# Patient Record
Sex: FEMALE | Race: WHITE | NOT HISPANIC OR LATINO | Employment: FULL TIME | URBAN - METROPOLITAN AREA
[De-identification: names, ages, dates, MRNs, and addresses within clinical notes are randomized per-mention and may not be internally consistent; named-entity substitution may affect disease eponyms.]

---

## 2018-05-23 RX ORDER — LEVOTHYROXINE SODIUM 88 UG/1
TABLET ORAL
Qty: 90 TABLET | Refills: 0 | Status: SHIPPED | OUTPATIENT
Start: 2018-05-23 | End: 2018-05-23 | Stop reason: SDUPTHER

## 2018-05-24 RX ORDER — LEVOTHYROXINE SODIUM 88 UG/1
TABLET ORAL
Qty: 90 TABLET | Refills: 0 | Status: SHIPPED | OUTPATIENT
Start: 2018-05-24 | End: 2018-06-26

## 2018-06-08 ENCOUNTER — OFFICE VISIT (OUTPATIENT)
Dept: PRIMARY CARE | Facility: CLINIC | Age: 63
End: 2018-06-08
Payer: COMMERCIAL

## 2018-06-08 VITALS
OXYGEN SATURATION: 98 % | DIASTOLIC BLOOD PRESSURE: 74 MMHG | HEART RATE: 62 BPM | RESPIRATION RATE: 16 BRPM | HEIGHT: 66 IN | TEMPERATURE: 98.3 F | SYSTOLIC BLOOD PRESSURE: 106 MMHG | BODY MASS INDEX: 18.99 KG/M2 | WEIGHT: 118.2 LBS

## 2018-06-08 DIAGNOSIS — E55.9 VITAMIN D DEFICIENCY: ICD-10-CM

## 2018-06-08 DIAGNOSIS — E03.9 ACQUIRED HYPOTHYROIDISM: Primary | ICD-10-CM

## 2018-06-08 DIAGNOSIS — E78.9 LIPID DISORDER: ICD-10-CM

## 2018-06-08 DIAGNOSIS — I25.10 CORONARY ARTERY DISEASE INVOLVING NATIVE CORONARY ARTERY OF NATIVE HEART WITHOUT ANGINA PECTORIS: ICD-10-CM

## 2018-06-08 DIAGNOSIS — I10 ESSENTIAL HYPERTENSION: ICD-10-CM

## 2018-06-08 DIAGNOSIS — D50.9 IRON DEFICIENCY ANEMIA, UNSPECIFIED IRON DEFICIENCY ANEMIA TYPE: ICD-10-CM

## 2018-06-08 PROCEDURE — 90471 IMMUNIZATION ADMIN: CPT | Performed by: FAMILY MEDICINE

## 2018-06-08 PROCEDURE — 99214 OFFICE O/P EST MOD 30 MIN: CPT | Mod: 25 | Performed by: FAMILY MEDICINE

## 2018-06-08 PROCEDURE — 90750 HZV VACC RECOMBINANT IM: CPT | Performed by: FAMILY MEDICINE

## 2018-06-08 RX ORDER — VIT C/E/ZN/COPPR/LUTEIN/ZEAXAN 250MG-90MG
2 CAPSULE ORAL DAILY
COMMUNITY
Start: 2017-11-09

## 2018-06-08 RX ORDER — METOPROLOL TARTRATE 25 MG/1
0.5 TABLET, FILM COATED ORAL 2 TIMES DAILY
COMMUNITY
End: 2020-04-23

## 2018-06-08 RX ORDER — ASPIRIN 81 MG/1
81 TABLET ORAL DAILY
COMMUNITY
Start: 2015-08-28

## 2018-06-08 RX ORDER — ATORVASTATIN CALCIUM 20 MG/1
20 TABLET, FILM COATED ORAL DAILY
COMMUNITY
End: 2020-05-18 | Stop reason: SDUPTHER

## 2018-06-08 ASSESSMENT — ENCOUNTER SYMPTOMS
DYSURIA: 0
UNEXPECTED WEIGHT CHANGE: 0
SHORTNESS OF BREATH: 0
DYSPHORIC MOOD: 0
ABDOMINAL PAIN: 0
VOMITING: 0
ROS GI COMMENTS: NO CHANGE IN BMS  NO GER
COUGH: 0
NAUSEA: 0
FEVER: 0
FREQUENCY: 0

## 2018-06-08 NOTE — PROGRESS NOTES
Lidia Peña DO    [facility] - Family Medicine  [address]  [place] [address]  Phone: [phone]  Fax: [phone]     History of Present Illness   Subjective     Patient ID: Dominga Dee is a 63 y.o. female.    Dominga is here for FU on HTN, Chol , CAD -   BP w med is controlled  BMI=19   wt down 4 lbs that she says is due to increased walking  Cardio sees her for CAD / MI 8/15 and last FU 5/17 and stress tst neg 9/17 and told no FU needed for now  Chol on med -BW 3/17 and not done so reordered  Thyroid- on med -BW ok 6/17 and ordered  diet - good and same  exercise- more walks 1-2 hrs qd  Hx of low  Vit D-  on 2000 IU and BW ok 3/17  Immunoglobin G elevated and so was referred  to heme/onc and eval neg per 7/17 note and told to FU this summer    HM-  -flu shot 11/17   -[id number] due at 64  -pneumovax due at 65  -TdaP 1/12  -Zostavax 12/14   -SHingrix 6/18 today  -PAP/gyn exam ~2015 and  due so set up - given # for new gyn last appt and again today  -mammo 10/17 and due in 6 mos but not done so set up-has slip to set up and reminder from radiology  -DEXA due @ 64  -scope  overdue-has GI # to set up but wants to do  FIT instead but not done so plans to do and has cards at home  -BW 3/17 and FU 6/17 - ordered again now  -Hep C done by reuben and told neg so will get a copy  -EKG 9/13  -physical 11/17       Past Medical/Surgical/Family/Social History     The following have been reviewed and updated as appropriate in this visit:  Allergies  Meds  Problems         Past Medical History:   Diagnosis Date   • Allergic rhinitis    • Coronary artery disease    • Hyperproteinemia     w neg heme eval   • Hypertension    • Hypothyroidism    • Lipid disorder        Past Surgical History:   Procedure Laterality Date   • CARDIAC CATHETERIZATION      w 90% occlusion 8/15   • CHOLECYSTECTOMY     • KNEE SURGERY      ORIF patella   • TONSILLECTOMY         Family History   Problem Relation  Age of Onset   • Osteoporosis Mother    • Heart disease Father    • Hypertension Father    • Heart disease Brother        Social History     Social History   • Marital status:      Spouse name: N/A   • Number of children: 2   • Years of education: N/A     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      Social History Main Topics   • Smoking status: Never Smoker   • Smokeless tobacco: Never Used   • Alcohol use No   • Drug use: Unknown   • Sexual activity: Not on file     Other Topics Concern   • Not on file     Social History Narrative   • No narrative on file      Allergies and Medications     Allergies   Allergen Reactions   • No Known Allergies          Current Outpatient Prescriptions:   •  aspirin 81 mg enteric coated tablet, Take 81 mg by mouth daily., Disp: , Rfl:   •  atorvastatin (LIPITOR) 20 mg tablet, Take 20 mg by mouth daily., Disp: , Rfl:   •  cholecalciferol, vitamin D3, 1,000 unit capsule, Take 2 capsules by mouth daily., Disp: , Rfl:   •  levothyroxine (SYNTHROID) 88 mcg tablet, TAKE 1 TABLET BY MOUTH EVERY DAY, Disp: 90 tablet, Rfl: 0  •  metoprolol tartrate (LOPRESSOR) 25 mg tablet, Take 0.5 tablets by mouth 2 (two) times a day., Disp: , Rfl:   •  multivitamin with minerals liquid, 2 tablespoons daily, Disp: , Rfl:    Review of Systems       Review of Systems   Constitutional: Negative for fever and unexpected weight change.   HENT:        No URI   Respiratory: Negative for cough and shortness of breath.    Cardiovascular: Negative for chest pain and leg swelling.   Gastrointestinal: Negative for abdominal pain, nausea and vomiting.        No change in Bms  No ANTONY   Genitourinary: Negative for dysuria, frequency and urgency.   Skin: Negative for rash.   Psychiatric/Behavioral: Negative for behavioral problems and dysphoric mood.      Physical Examination       Objective     Vitals:    06/08/18 1105   BP: 106/74   Pulse: 62   Resp: 16   Temp: 36.8 °C (98.3 °F)   SpO2: 98%       Wt  "Readings from Last 3 Encounters:   06/08/18 53.6 kg (118 lb 3.2 oz)       Ht Readings from Last 3 Encounters:   06/08/18 1.671 m (5' 5.8\")       BP Readings from Last 3 Encounters:   06/08/18 106/74       Physical Exam   Constitutional: She is oriented to person, place, and time. She appears well-developed and well-nourished. No distress.   HENT:   Head: Normocephalic and atraumatic.   Neck: Neck supple. No thyromegaly present.   Cardiovascular: Normal rate and regular rhythm.    No pedal edema   Pulmonary/Chest: Effort normal and breath sounds normal.   Abdominal: Soft. Bowel sounds are normal. There is no tenderness.   Lymphadenopathy:     She has no cervical adenopathy.   Neurological: She is alert and oriented to person, place, and time.   Skin: Skin is warm and dry.   Psychiatric: She has a normal mood and affect. Her behavior is normal.   Nursing note and vitals reviewed.     Laboratory Results     Lab Results   Component Value Date    WBC 4.90 08/13/2017    HGB 10.9 (L) 08/13/2017    HCT 32.8 (L) 08/13/2017    MCV 90.4 08/13/2017     08/13/2017          Chemistry        Component Value Date/Time     04/12/2017 1536    K 4.2 04/12/2017 1536     04/12/2017 1536    CO2 25 04/12/2017 1536    BUN 9 04/12/2017 1536    CREATININE 0.5 (L) 04/12/2017 1536        Component Value Date/Time    CALCIUM 9.2 04/12/2017 1536    ALKPHOS 68 04/12/2017 1536    AST 30 04/12/2017 1536    ALT 25 04/12/2017 1536    BILITOT 0.5 04/12/2017 1536            Lab Results   Component Value Date    GLUCOSE 84 04/12/2017    CALCIUM 9.2 04/12/2017     04/12/2017    K 4.2 04/12/2017    CO2 25 04/12/2017     04/12/2017    BUN 9 04/12/2017    CREATININE 0.5 (L) 04/12/2017       Lab Results   Component Value Date    CHOL 118 (L) 03/11/2017    CHOL 164 02/06/2016    CHOL 171 08/26/2015     Lab Results   Component Value Date    HDL 65 03/11/2017    HDL 69 02/06/2016    HDL 53 (L) 08/26/2015     Lab Results "   Component Value Date    LDLCALC 42 03/11/2017    LDLCALC 83 02/06/2016    LDLCALC 103 (H) 08/26/2015     Lab Results   Component Value Date    TRIG 56 03/11/2017    TRIG 59 02/06/2016    TRIG 74 08/26/2015     No results found for: CHOLHDL    Lab Results   Component Value Date    TSH 0.53 06/09/2017       No results found for: HGBA1C    Lab Results   Component Value Date    HEPCAB NEGATIVE 05/08/2017         Immunization History   Administered Date(s) Administered   • Influenza Split High Dose Preservative Free IM 09/30/2014   • Influenza Split Preservative Free ID 09/10/2013   • Influenza Vaccine Quadrivalent 3 Yr And Older 08/01/2016   • Influenza Vaccine Quadrivalent Preservative Free 6-35 Months 09/10/2015   • Influenza, Quadrivalent 11/09/2017   • Influenza, Unspecified 09/10/2015   • Tdap 01/16/2012   • Zoster 12/22/2014          Assessment and Plan       Assessment/Plan     Problem List     CAD (coronary artery disease)    Overview     Overview: stress echo neg 9/17         Current Assessment & Plan     Stable w no symptoms  Txn: B blocker, statin and ASA         Relevant Orders    Lipid panel    Hypothyroidism - Primary    Overview     Overview:          Current Assessment & Plan     BW ordered on med         Relevant Orders    TSH w reflex FT4    Vitamin D deficiency    Overview     Overview:          Current Assessment & Plan     Is on supplement         Hypertension    Current Assessment & Plan     Stable w txn         Relevant Orders    Comprehensive metabolic panel    Lipid disorder    Current Assessment & Plan     BW ordered on med         Relevant Orders    Lipid panel    Comprehensive metabolic panel          Return physical 11/18.    Orders Placed This Encounter   Procedures   • Shingrix   • Lipid panel     Standing Status:   Future     Number of Occurrences:   1     Standing Expiration Date:   6/8/2019   • TSH w reflex FT4     Standing Status:   Future     Number of Occurrences:   1     Standing  Expiration Date:   6/8/2019   • Comprehensive metabolic panel     Standing Status:   Future     Number of Occurrences:   1     Standing Expiration Date:   6/8/2019   • CBC     Standing Status:   Future     Number of Occurrences:   1     Standing Expiration Date:   6/8/2019        Inés Peña DO  6/8/2018

## 2018-06-16 LAB
ALBUMIN SERPL-MCNC: 4.3 G/DL (ref 3.6–4.8)
ALBUMIN/GLOB SERPL: 1.2 {RATIO} (ref 1.2–2.2)
ALP SERPL-CCNC: 72 IU/L (ref 39–117)
ALT SERPL-CCNC: 24 IU/L (ref 0–32)
AST SERPL-CCNC: 32 IU/L (ref 0–40)
BASOPHILS # BLD AUTO: 0 X10E3/UL (ref 0–0.2)
BASOPHILS NFR BLD AUTO: 0 %
BILIRUB SERPL-MCNC: 0.3 MG/DL (ref 0–1.2)
BUN SERPL-MCNC: 6 MG/DL (ref 8–27)
BUN/CREAT SERPL: 11 (ref 12–28)
CALCIUM SERPL-MCNC: 9.3 MG/DL (ref 8.7–10.3)
CHLORIDE SERPL-SCNC: 102 MMOL/L (ref 96–106)
CHOLEST SERPL-MCNC: 124 MG/DL (ref 100–199)
CO2 SERPL-SCNC: 24 MMOL/L (ref 20–29)
CREAT SERPL-MCNC: 0.55 MG/DL (ref 0.57–1)
EOSINOPHIL # BLD AUTO: 0.1 X10E3/UL (ref 0–0.4)
EOSINOPHIL NFR BLD AUTO: 1 %
ERYTHROCYTE [DISTWIDTH] IN BLOOD BY AUTOMATED COUNT: 12.9 % (ref 12.3–15.4)
GFR SERPLBLD CREATININE-BSD FMLA CKD-EPI: 115 ML/MIN/1.73
GFR SERPLBLD CREATININE-BSD FMLA CKD-EPI: 99 ML/MIN/1.73
GLOBULIN SER CALC-MCNC: 3.7 G/DL (ref 1.5–4.5)
GLUCOSE SERPL-MCNC: 79 MG/DL (ref 65–99)
HCT VFR BLD AUTO: 36.2 % (ref 34–46.6)
HDLC SERPL-MCNC: 64 MG/DL
HGB BLD-MCNC: 11.7 G/DL (ref 11.1–15.9)
IMM GRANULOCYTES # BLD: 0 X10E3/UL (ref 0–0.1)
IMM GRANULOCYTES NFR BLD: 0 %
LABCORP AMBIG ABBREV: NORMAL
LABCORP AMBIG ABBREV: NORMAL
LDLC SERPL CALC-MCNC: 51 MG/DL (ref 0–99)
LYMPHOCYTES # BLD AUTO: 1.5 X10E3/UL (ref 0.7–3.1)
LYMPHOCYTES NFR BLD AUTO: 42 %
MCH RBC QN AUTO: 30.2 PG (ref 26.6–33)
MCHC RBC AUTO-ENTMCNC: 32.3 G/DL (ref 31.5–35.7)
MCV RBC AUTO: 94 FL (ref 79–97)
MONOCYTES # BLD AUTO: 0.2 X10E3/UL (ref 0.1–0.9)
MONOCYTES NFR BLD AUTO: 6 %
NEUTROPHILS # BLD AUTO: 1.8 X10E3/UL (ref 1.4–7)
NEUTROPHILS NFR BLD AUTO: 51 %
PLATELET # BLD AUTO: 222 X10E3/UL (ref 150–379)
POTASSIUM SERPL-SCNC: 4.7 MMOL/L (ref 3.5–5.2)
PROT SERPL-MCNC: 8 G/DL (ref 6–8.5)
RBC # BLD AUTO: 3.87 X10E6/UL (ref 3.77–5.28)
SODIUM SERPL-SCNC: 143 MMOL/L (ref 134–144)
TRIGL SERPL-MCNC: 46 MG/DL (ref 0–149)
TSH SERPL DL<=0.005 MIU/L-ACNC: 0.21 UIU/ML (ref 0.45–4.5)
VLDLC SERPL CALC-MCNC: 9 MG/DL (ref 5–40)
WBC # BLD AUTO: 3.5 X10E3/UL (ref 3.4–10.8)

## 2018-06-26 DIAGNOSIS — E03.9 ACQUIRED HYPOTHYROIDISM: Primary | ICD-10-CM

## 2018-06-26 RX ORDER — LEVOTHYROXINE SODIUM 75 UG/1
75 TABLET ORAL
Qty: 90 TABLET | Refills: 0 | Status: SHIPPED | OUTPATIENT
Start: 2018-06-26 | End: 2018-08-08 | Stop reason: SDUPTHER

## 2018-08-08 RX ORDER — LEVOTHYROXINE SODIUM 75 UG/1
75 TABLET ORAL
Qty: 90 TABLET | Refills: 0 | Status: SHIPPED | OUTPATIENT
Start: 2018-08-08 | End: 2018-08-16 | Stop reason: SDUPTHER

## 2018-08-08 RX ORDER — LEVOTHYROXINE SODIUM 88 UG/1
TABLET ORAL
Qty: 90 TABLET | Refills: 0 | OUTPATIENT
Start: 2018-08-08

## 2018-08-16 RX ORDER — LEVOTHYROXINE SODIUM 75 UG/1
75 TABLET ORAL
Qty: 90 TABLET | Refills: 0 | Status: SHIPPED | OUTPATIENT
Start: 2018-08-16 | End: 2018-10-26 | Stop reason: SDUPTHER

## 2018-08-16 RX ORDER — LEVOTHYROXINE SODIUM 88 UG/1
TABLET ORAL
Qty: 90 TABLET | Refills: 1 | OUTPATIENT
Start: 2018-08-16

## 2018-08-16 NOTE — TELEPHONE ENCOUNTER
Dr. Peña    Last office visit -06/08/2018  *expected visit -11/16/2018      88mcg tablet daily  #90 1 refills     [facility] delivery

## 2018-09-17 ENCOUNTER — TELEPHONE (OUTPATIENT)
Dept: PRIMARY CARE | Facility: CLINIC | Age: 63
End: 2018-09-17

## 2018-09-17 NOTE — TELEPHONE ENCOUNTER
Pt LVM stating she is moving to [place] next month and needs to set up her 2nd Shingrix before she leaves. Pt mentioned preferring a Friday apt. Pt can come in any time on Friday. LMOM for pt to return call.

## 2018-10-26 RX ORDER — LEVOTHYROXINE SODIUM 88 UG/1
TABLET ORAL
Qty: 90 TABLET | Refills: 0 | OUTPATIENT
Start: 2018-10-26

## 2018-10-26 RX ORDER — LEVOTHYROXINE SODIUM 75 UG/1
75 TABLET ORAL
Qty: 90 TABLET | Refills: 0 | Status: SHIPPED | OUTPATIENT
Start: 2018-10-26 | End: 2020-05-18 | Stop reason: SDUPTHER

## 2018-11-13 ENCOUNTER — TELEPHONE (OUTPATIENT)
Dept: PRIMARY CARE | Facility: CLINIC | Age: 63
End: 2018-11-13

## 2018-11-13 NOTE — TELEPHONE ENCOUNTER
LMOM requesting return call for pt to schedule 2nd Shringrix. Pt stated last call that she was moving to [place].

## 2020-04-06 ENCOUNTER — TELEPHONE (OUTPATIENT)
Dept: PRIMARY CARE | Facility: CLINIC | Age: 65
End: 2020-04-06

## 2020-04-06 NOTE — TELEPHONE ENCOUNTER
Ms. Dee is a prior patient of Dr Peña and would like to continue care with her since she is moving back to the area.  She will need prescription renewals in ~2 months and would like an office visit then.  Please contact Dominga to confirm whether or not Dr. Peña can see her as a new patient and, if so, when?  Thank you

## 2020-04-23 ENCOUNTER — TELEMEDICINE (OUTPATIENT)
Dept: PRIMARY CARE | Facility: CLINIC | Age: 65
End: 2020-04-23
Payer: COMMERCIAL

## 2020-04-23 DIAGNOSIS — I10 ESSENTIAL HYPERTENSION: ICD-10-CM

## 2020-04-23 DIAGNOSIS — E03.9 ACQUIRED HYPOTHYROIDISM: Primary | ICD-10-CM

## 2020-04-23 DIAGNOSIS — Z13.9 ENCOUNTER FOR SCREENING: ICD-10-CM

## 2020-04-23 DIAGNOSIS — I25.10 CORONARY ARTERY DISEASE INVOLVING NATIVE CORONARY ARTERY OF NATIVE HEART WITHOUT ANGINA PECTORIS: ICD-10-CM

## 2020-04-23 DIAGNOSIS — E78.9 LIPID DISORDER: ICD-10-CM

## 2020-04-23 PROCEDURE — 99214 OFFICE O/P EST MOD 30 MIN: CPT | Mod: 95 | Performed by: FAMILY MEDICINE

## 2020-04-23 RX ORDER — ALENDRONATE SODIUM 70 MG/1
70 TABLET ORAL WEEKLY
COMMUNITY
End: 2020-05-18 | Stop reason: SDUPTHER

## 2020-04-23 ASSESSMENT — ENCOUNTER SYMPTOMS
SHORTNESS OF BREATH: 0
FEVER: 0
NAUSEA: 0
WEAKNESS: 0
SORE THROAT: 0
WHEEZING: 0
DYSPHORIC MOOD: 0
VOMITING: 0
DYSURIA: 0
COUGH: 0
FREQUENCY: 0
ABDOMINAL PAIN: 0
ROS GI COMMENTS: NO CHANGE IN BMS  NO GER
UNEXPECTED WEIGHT CHANGE: 0

## 2020-04-23 NOTE — PROGRESS NOTES
Inés Peña DO  [facility]  Family Medicine  [address], [address]  [place] [address]  Phone: [phone]  Fax: [phone]     [facility] TELEMEDICINE ENCOUNTER  History of Present Illness/Chief Complaint     Dominga Dee is a 65 y.o. established patient at [facility]. This patient is participating in a telemedicine encounter due to inability or contraindication(s) to present to the office in-person.     This telemedicine visit will be billed to the patient's health insurance or to the patient directly (if this individual is self-insured). The patient verbalized an understanding that he/she will be responsible for any co-payments, deductibles, or co-insurances that apply to this telemedicine visit. This visit was not related to any in-person evaluation or appointment within the last seven days. In addition, the patient does not have an upcoming appointment with any of our providers within the next 24 hours.The patient has verbalized an understanding of the above, and has verbally consented to this virtual appointment. Time spent during this virtual encounter totaled: _27__ minutes.   Request for Consent:   Video Encounter   Hello, my name is Inés Peña DO.  Before we proceed, can you please verify your identification by telling me your full name and date of birth?  Can you tell me who is in the room with you?    You and I are about to have a telemedicine check-in or visit because you have requested it.  This is a live video-conference.  I am a real person, speaking to you in real time.  There is no one else with me on the video-conference.  However, when we use ([employer], Skype, etc) it is important for you to know that the video-conference may not be secure or private.  I am not recording this conversation and I am asking you not to record it.  This telemedicine visit will be billed to your health insurance or you, if you are self-insured.  You understand you  will be responsible for any copayments or coinsurances that apply to your telemedicine visit.  Before starting our telemedicine visit, I am required to get your consent for this virtual check-in or visit by telemedicine. Do you consent?    Patient Response to Request for Consent:  Yes     Dominga agrees to telemed visit with me today to review her Med Hx  Last seen 6/18 - moved to [place] and then moved back here 3 wks ago since felt too far from family in [place] so renting here  Hypothyroid w TSH high in 2018 so lowered to 75 mcg Levothyroxine qd and BW done 1/2020 and thinks  that it was normal and no med change  Chol on Lipitor 20 qd  - BW to goal 2018 and 1/2020 and thinks was normal w no med change  HTN  - BP on Metoprolol 25 1/2 BID last appt but now off it after seeing cardio in [place] and not chked recently but has a home cuff so chk readings and send in 3 wks  Cardio sees her for CAD / MI 8/15 and last FU 5/17 and stress tst neg 9/17 - last seen by [place] cardio 2/2020 w no concerns  Diet  -good but overeats  Exercise -walks one hour per day  BMI 19 at last appt  Hx of low  Vit D-  on 2000 IU and BW ok 3/17  Immunoglobin G elevated and so was referred  to heme/onc and eval neg per 7/17 note and says had FU 2018 and no FU needed  Osteoporosis on DEXA this winter so Fosamax started ~12/19    HM-  -flu shot ~10/19  -Tdap 1/12  -[id number] ~2018  -[id number] may be due so will records  -Shingrix 6/18 and got second and actually did first over also so will get dates  -Zostavax 2014  -PAP/gyn 5/15 w neg PAP/HPV and done 4/19 that was normal and told no further PAPs needed  -mammo 10/17 and last done there ~2/2020 and due 6 mos on L as FU  -scope not done so FIT ordered to send to her  -DEXA 12/19 +  -BW 6/18 and done 2/2020  -Hep C screen neg w heme in past  -EKG 9/13  -MWV 1/2020 w last dr  -falls screen neg 1/2020     w 2 kids who are in this area and siblings and grandkids here so was flying up often when in [place]  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   HENT: Negative for ear pain and sore throat.         No URI   Respiratory: Negative for cough, shortness of breath and wheezing.    Cardiovascular: Negative for chest pain and leg swelling.   Gastrointestinal: Negative for abdominal pain, nausea and vomiting.        No change in Bms  No ANTONY   Endocrine: Negative for polyuria.   Genitourinary: Negative for dysuria, frequency and urgency.   Skin: Negative for rash.   Neurological: Negative for weakness.   Psychiatric/Behavioral: Negative for behavioral problems and dysphoric mood.       Past Medical Hx - Surgical Hx - Family Hx - Social Hx     The following have been reviewed and updated as appropriate in this visit:  Allergies  Meds  Problems         Past Medical History:   Diagnosis Date   • Allergic rhinitis    • Coronary artery disease    • Hyperproteinemia     w neg heme eval   • Hypertension    • Hypothyroidism    • Lipid disorder        Past Surgical History:   Procedure Laterality Date   • CARDIAC CATHETERIZATION      w 90% occlusion 8/15   • CHOLECYSTECTOMY     • KNEE SURGERY      ORIF patella   • TONSILLECTOMY         Family History   Problem Relation Age of Onset   • Osteoporosis Biological Mother    • Heart disease Biological Father    • Hypertension Biological Father    • Heart disease Biological Brother        Social History     Socioeconomic History   • Marital status:      Spouse name: Not on file   • Number of children: 2   • Years of education: Not on file   • Highest education level: Not on file   Occupational History   • 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   Social Needs   • Financial resource strain: Not on file   • Food insecurity:     Worry: Not on file     Inability: Not on file   • Transportation needs:     Medical: Not on file     Non-medical: Not on file   Tobacco Use   • Smoking status:  Never Smoker   • Smokeless tobacco: Never Used   Substance and Sexual Activity   • Alcohol use: No   • Drug use: Not on file   • Sexual activity: Not on file   Lifestyle   • Physical activity:     Days per week: Not on file     Minutes per session: Not on file   • Stress: Not on file   Relationships   • Social connections:     Talks on phone: Not on file     Gets together: Not on file     Attends Mosque service: Not on file     Active member of club or organization: Not on file     Attends meetings of clubs or organizations: Not on file     Relationship status: Not on file   • Intimate partner violence:     Fear of current or ex partner: Not on file     Emotionally abused: Not on file     Physically abused: Not on file     Forced sexual activity: Not on file   Other Topics Concern   • Not on file   Social History Narrative   • Not on file        Allergies & Medications     Allergies   Allergen Reactions   • No Known Allergies        Current Outpatient Medications   Medication Sig Dispense Refill   • alendronate (FOSAMAX) 70 mg tablet Take 70 mg by mouth once a week. Take on an empty stomach and do not lie down for the next 30 min.  Started 2019     • multivitamin tablet Take by mouth daily. w Vit C     • aspirin 81 mg enteric coated tablet Take 81 mg by mouth daily.     • atorvastatin (LIPITOR) 20 mg tablet Take 20 mg by mouth daily.     • cholecalciferol, vitamin D3, 1,000 unit capsule Take 2 capsules by mouth daily.     • levothyroxine (SYNTHROID) 75 mcg tablet Take 1 tablet (75 mcg total) by mouth daily. 90 tablet 0     No current facility-administered medications for this visit.         Laboratory Results     Lab Results   Component Value Date    WBC 3.5 06/15/2018    HGB 11.7 06/15/2018    HCT 36.2 06/15/2018     06/15/2018        Lab Results   Component Value Date    GLUCOSE 79 06/15/2018    CALCIUM 9.2 04/12/2017     06/15/2018    K 4.7 06/15/2018    CO2 24 06/15/2018     06/15/2018     BUN 6 (L) 06/15/2018    CREATININE 0.55 (L) 06/15/2018    ALKPHOS 72 06/15/2018    AST 32 06/15/2018    ALT 24 06/15/2018    BILITOT 0.3 06/15/2018    ALBUMIN 4.3 06/15/2018       Lab Results   Component Value Date    CHOL 124 06/15/2018     Lab Results   Component Value Date    HDL 64 06/15/2018     Lab Results   Component Value Date    LDLCALC 51 06/15/2018     Lab Results   Component Value Date    TRIG 46 06/15/2018       Lab Results   Component Value Date    TSH 0.207 (L) 06/15/2018       No results found for: HGBA1C    Lab Results   Component Value Date    HEPCAB NEGATIVE 05/08/2017       Immunization History   Administered Date(s) Administered   • Influenza Split High Dose Preservative Free IM 09/30/2014   • Influenza Split Preservative Free ID 09/10/2013   • Influenza Vaccine Quadrivalent 3 Yr And Older 08/01/2016   • Influenza Vaccine Quadrivalent Preservative Free 6-35 Months 09/10/2015   • Influenza, Quadrivalent 11/09/2017   • Influenza, Unspecified 09/10/2015   • Tdap 01/16/2012   • Zoster 12/22/2014   • Zoster Vaccine Recombinant Adjuvanted (Shingrix) 06/08/2018       Health Maintenance Topics with due status: Overdue       Topic Date Due    DEXA Scan 1954    Medicare Annual Wellness Visit 04/16/1972    Colonoscopy 04/16/2004    Zoster Vaccine 08/03/2018    Pneumococcal (65 years and older) 04/16/2019    Annual Falls Risk Screening 04/16/2019    Mammogram 10/13/2019     Health Maintenance Topics with due status: Not Due       Topic Last Completion Date    DTaP, Tdap, and Td Vaccines 01/16/2012    Influenza Vaccine 11/09/2017     Health Maintenance Topics with due status: Completed       Topic Last Completion Date    Hepatitis C Screening 11/14/2017     Health Maintenance Topics with due status: Aged Out       Topic Date Due    Meningococcal ACWY Aged Out    HIB Vaccines Aged Out    IPV Vaccines Aged Out    HPV Vaccines Aged Out    Pneumococcal Aged Out     Health Maintenance Topics with due  status: Discontinued       Topic Date Due    Varicella Vaccines Discontinued        Assessment and Plan     Assessment/Plan     Problem List Items Addressed This Visit        Circulatory    CAD (coronary artery disease)    Overview     Overview: stress echo neg 9/17         Current Assessment & Plan     See HPI for assessment and plan on all med Dxs         Hypertension       Endocrine/Metabolic    Hypothyroidism - Primary    Overview     Overview:          Lipid disorder       Other    Encounter for screening    Relevant Orders    Fecal Immunochemical          Follow-up: 4 mos for med SUSY Peña DO    4/23/2020

## 2020-05-04 ENCOUNTER — LAB REQUISITION (OUTPATIENT)
Dept: LAB | Facility: HOSPITAL | Age: 65
End: 2020-05-04
Attending: FAMILY MEDICINE
Payer: COMMERCIAL

## 2020-05-04 DIAGNOSIS — Z13.9 ENCOUNTER FOR SCREENING, UNSPECIFIED: ICD-10-CM

## 2020-05-06 LAB — HEMOCCULT STL QL IA: NEGATIVE

## 2020-05-18 RX ORDER — LEVOTHYROXINE SODIUM 75 UG/1
75 TABLET ORAL
Qty: 90 TABLET | Refills: 0 | Status: SHIPPED | OUTPATIENT
Start: 2020-05-18 | End: 2020-06-03 | Stop reason: SDUPTHER

## 2020-05-18 RX ORDER — ALENDRONATE SODIUM 70 MG/1
70 TABLET ORAL WEEKLY
Qty: 12 TABLET | Refills: 0 | Status: SHIPPED | OUTPATIENT
Start: 2020-05-18 | End: 2020-05-19 | Stop reason: SDUPTHER

## 2020-05-18 RX ORDER — ATORVASTATIN CALCIUM 20 MG/1
20 TABLET, FILM COATED ORAL DAILY
Qty: 90 TABLET | Refills: 0 | Status: SHIPPED | OUTPATIENT
Start: 2020-05-18 | End: 2020-06-03 | Stop reason: SDUPTHER

## 2020-05-18 NOTE — TELEPHONE ENCOUNTER
Medicine Refill Request    Last Office Visit: 6/8/2018  Last Telemedicine Visit: 4/23/2020 Inés Peña,     Next Office Visit: Visit date not found  Next Telemedicine Visit: Visit date not found         Current Outpatient Medications:   •  alendronate (FOSAMAX) 70 mg tablet, Take 70 mg by mouth once a week. Take on an empty stomach and do not lie down for the next 30 min. Started 2019, Disp: , Rfl:   •  aspirin 81 mg enteric coated tablet, Take 81 mg by mouth daily., Disp: , Rfl:   •  atorvastatin (LIPITOR) 20 mg tablet, Take 20 mg by mouth daily., Disp: , Rfl:   •  cholecalciferol, vitamin D3, 1,000 unit capsule, Take 2 capsules by mouth daily., Disp: , Rfl:   •  levothyroxine (SYNTHROID) 75 mcg tablet, Take 1 tablet (75 mcg total) by mouth daily., Disp: 90 tablet, Rfl: 0  •  multivitamin tablet, Take by mouth daily. w Vit C, Disp: , Rfl:       BP Readings from Last 3 Encounters:   06/08/18 106/74       Recent Lab results:  Lab Results   Component Value Date    CHOL 124 06/15/2018   ,   Lab Results   Component Value Date    HDL 64 06/15/2018   ,   Lab Results   Component Value Date    LDLCALC 51 06/15/2018   ,   Lab Results   Component Value Date    TRIG 46 06/15/2018        Lab Results   Component Value Date    GLUCOSE 79 06/15/2018   , No results found for: HGBA1C      Lab Results   Component Value Date    CREATININE 0.55 (L) 06/15/2018       Lab Results   Component Value Date    TSH 0.207 (L) 06/15/2018

## 2020-05-19 RX ORDER — ALENDRONATE SODIUM 70 MG/1
70 TABLET ORAL WEEKLY
Qty: 12 TABLET | Refills: 0 | Status: SHIPPED | OUTPATIENT
Start: 2020-05-19 | End: 2020-06-03 | Stop reason: SDUPTHER

## 2020-05-19 RX ORDER — ALENDRONATE SODIUM 70 MG/1
70 TABLET ORAL WEEKLY
Qty: 12 TABLET | Refills: 0 | Status: SHIPPED | OUTPATIENT
Start: 2020-05-19 | End: 2020-05-19 | Stop reason: SDUPTHER

## 2020-06-03 ENCOUNTER — TELEPHONE (OUTPATIENT)
Dept: PRIMARY CARE | Facility: CLINIC | Age: 65
End: 2020-06-03

## 2020-06-03 RX ORDER — LEVOTHYROXINE SODIUM 75 UG/1
75 TABLET ORAL
Qty: 90 TABLET | Refills: 1 | Status: SHIPPED | OUTPATIENT
Start: 2020-06-03 | End: 2020-11-22 | Stop reason: SDUPTHER

## 2020-06-03 RX ORDER — ATORVASTATIN CALCIUM 20 MG/1
20 TABLET, FILM COATED ORAL NIGHTLY
Qty: 90 TABLET | Refills: 1 | Status: SHIPPED | OUTPATIENT
Start: 2020-06-03 | End: 2021-01-08 | Stop reason: SDUPTHER

## 2020-06-03 RX ORDER — ALENDRONATE SODIUM 70 MG/1
70 TABLET ORAL WEEKLY
Qty: 12 TABLET | Refills: 0 | Status: SHIPPED | OUTPATIENT
Start: 2020-06-03 | End: 2020-08-26

## 2020-06-03 NOTE — TELEPHONE ENCOUNTER
Called patient to clarify the medications she's requesting. LVM to call back to office for the names and dosages of meds. Suspect she's referring to Fosamax 70 mg, Lipitor 20 mg, and Synthroid 75 mcg, as these were e-sent on 5/18/20 by PCP to mail-order RX.     [facility] reported that her 2 scripts (Lip and Syn) were frozen and never sent. No clear reasoning. Cancelled.     [facility] states they did not have deliver address for patient and they could not reach her when attempted. Med cancelled.     Re-sent medications to local RX as requested by patient and sent portal email stating she needs to call [facility] if she wants to use them in the future.

## 2020-06-03 NOTE — TELEPHONE ENCOUNTER
Pt states [facility] cancelled out her prescription order so she wants her prescriptions (3 total) called into her local pharmacy  Because she is having problems with the mail order pharmacy      [address] [facility] ph [phone]

## 2020-08-26 RX ORDER — ALENDRONATE SODIUM 70 MG/1
TABLET ORAL
Qty: 12 TABLET | Refills: 0 | Status: SHIPPED | OUTPATIENT
Start: 2020-08-26 | End: 2020-08-28 | Stop reason: SDUPTHER

## 2020-08-26 NOTE — TELEPHONE ENCOUNTER
Medicine Refill Request    Last Office Visit: 6/8/2018  Last Telemedicine Visit: 4/23/2020 Inés Peña DO    Next Office Visit: 10/1/2020  Next Telemedicine Visit: Visit date not found         Current Outpatient Medications:   •  alendronate (FOSAMAX) 70 mg tablet, Take 1 tablet (70 mg total) by mouth once a week. Take on an empty stomach and do not lie down for the next 30 min. Started 2019, Disp: 12 tablet, Rfl: 0  •  aspirin 81 mg enteric coated tablet, Take 81 mg by mouth daily., Disp: , Rfl:   •  atorvastatin (LIPITOR) 20 mg tablet, Take 1 tablet (20 mg total) by mouth nightly., Disp: 90 tablet, Rfl: 1  •  cholecalciferol, vitamin D3, 1,000 unit capsule, Take 2 capsules by mouth daily., Disp: , Rfl:   •  levothyroxine (SYNTHROID) 75 mcg tablet, Take 1 tablet (75 mcg total) by mouth daily. Take on an empty stomach., Disp: 90 tablet, Rfl: 1  •  multivitamin tablet, Take by mouth daily. w Vit C, Disp: , Rfl:       BP Readings from Last 3 Encounters:   06/08/18 106/74       Recent Lab results:  Lab Results   Component Value Date    CHOL 124 06/15/2018   ,   Lab Results   Component Value Date    HDL 64 06/15/2018   ,   Lab Results   Component Value Date    LDLCALC 51 06/15/2018   ,   Lab Results   Component Value Date    TRIG 46 06/15/2018        Lab Results   Component Value Date    GLUCOSE 79 06/15/2018   , No results found for: HGBA1C      Lab Results   Component Value Date    CREATININE 0.55 (L) 06/15/2018       Lab Results   Component Value Date    TSH 0.207 (L) 06/15/2018

## 2020-08-27 NOTE — TELEPHONE ENCOUNTER
Medicine Refill Request    Last Office Visit: 6/8/2018  Last Telemedicine Visit: 4/23/2020 Inés Peña DO    Next Office Visit: 10/1/2020  Next Telemedicine Visit: Visit date not found         Current Outpatient Medications:   •  alendronate (FOSAMAX) 70 mg tablet, TAKE 1 TABLET BY MOUTH EVERY WEEK. TAKE ON EMPTY STOMACH, DO NOT LIE DOWN FOR 30 MINS, Disp: 12 tablet, Rfl: 0  •  aspirin 81 mg enteric coated tablet, Take 81 mg by mouth daily., Disp: , Rfl:   •  atorvastatin (LIPITOR) 20 mg tablet, Take 1 tablet (20 mg total) by mouth nightly., Disp: 90 tablet, Rfl: 1  •  cholecalciferol, vitamin D3, 1,000 unit capsule, Take 2 capsules by mouth daily., Disp: , Rfl:   •  levothyroxine (SYNTHROID) 75 mcg tablet, Take 1 tablet (75 mcg total) by mouth daily. Take on an empty stomach., Disp: 90 tablet, Rfl: 1  •  multivitamin tablet, Take by mouth daily. w Vit C, Disp: , Rfl:       BP Readings from Last 3 Encounters:   06/08/18 106/74       Recent Lab results:  Lab Results   Component Value Date    CHOL 124 06/15/2018   ,   Lab Results   Component Value Date    HDL 64 06/15/2018   ,   Lab Results   Component Value Date    LDLCALC 51 06/15/2018   ,   Lab Results   Component Value Date    TRIG 46 06/15/2018        Lab Results   Component Value Date    GLUCOSE 79 06/15/2018   , No results found for: HGBA1C      Lab Results   Component Value Date    CREATININE 0.55 (L) 06/15/2018       Lab Results   Component Value Date    TSH 0.207 (L) 06/15/2018

## 2020-08-28 ENCOUNTER — TELEPHONE (OUTPATIENT)
Dept: PRIMARY CARE | Facility: CLINIC | Age: 65
End: 2020-08-28

## 2020-08-28 RX ORDER — ALENDRONATE SODIUM 70 MG/1
TABLET ORAL
Qty: 12 TABLET | Refills: 0 | OUTPATIENT
Start: 2020-08-28

## 2020-08-28 RX ORDER — ALENDRONATE SODIUM 70 MG/1
70 TABLET ORAL WEEKLY
Qty: 12 TABLET | Refills: 0 | Status: SHIPPED | OUTPATIENT
Start: 2020-08-28 | End: 2020-11-24 | Stop reason: SDUPTHER

## 2020-08-28 NOTE — TELEPHONE ENCOUNTER
Resent refill, to [facility], called and let pt know.      ----- Message from Annie Narayanan MA sent at 8/28/2020  3:45 PM EDT -----  Regarding: FW:FIT Test  Contact: [phone]  Can you resend her script to the [facility] on [place]. It looks like Jay had requested it already a day before she asked to change pharmacies. Can you make her aware this is done after you send it? Or just send it back to me and I can reach out to her!  ----- Message -----  From: Dominga Dee  Sent: 8/28/2020   3:43 PM EDT  To: [facility] Clinical Support [facility]  Subject: RE:FIT Test                                      Hi Annie. I hope you don’t mind me reaching out to you for help. The office renewed my medication for Fosomax but had it sent to [facility]. They are no longer in my network so I requested they send it to [facility] IN [place]. I got a message that was denied. Would you be able to get them to send the medication to the [facility]. Thank you.  ----- Message -----  From: Annie Narayanan MA  Sent: 5/19/2020 11:36 AM EDT  To: Dominga Dee  Subject: RE:FIT Test  Good morning,  Yes, Dr. Peña asked that you make a follow up appointment for August. All three medication were sent electronically on the same day- but I do see a transmission error. I will ask that Dr. Peña resend the alendronate for the osteoporosis. Let me know if you have any questions! - Annie Narayanan CMA    ----- Message -----     From: Dominga Dee     Sent: 5/19/2020 11:24 AM EDT       To: Annie Narayanan MA  Subject: RE:FIT Test    Jimmy Valencia,  sorry to be a pain but I wanted to let you know that Arias called and said Dr Peña wanted to make an appointment. I already had a telephone appointment with her and at that time she had said she would see me around September. I have no problem coming into the office, I just wanted to make sure that she really wanted me to. also got a message from [employer] about the Rx. They only have 2, they did not  mention the medication for the osteoporosis. just wanted to mention that to you. Thanks again.  ----- Message -----  From: Annie Narayanan MA  Sent: 5/18/2020  4:53 PM EDT  To: Dominga ANDREA Luiz  Subject: RE:FIT Test  Yes. They were sent to [facility] home delivery pharmacy. - Annie Narayanan CMA    ----- Message -----     From: Colleen Luiz     Sent: 5/18/2020  4:51 PM EDT       To: Annie Narayanan MA  Subject: RE:FIT Test    Jimmy Valencia    I see in my records approved prescriptions. does this mean that you were able to order them via [facility]'s mail order?      Thanks so much     Dominga  ----- Message -----  From: Annie Narayanan MA  Sent: 5/7/2020  9:08 AM EDT  To: Dominga ANDREA Luiz  Subject: FIT Test  Good morning Dominga,  The stool cards came back normal! -Annie Narayanan CMA

## 2020-11-23 NOTE — TELEPHONE ENCOUNTER
Medicine Refill Request    Last Office Visit: 6/8/2018  Last Telemedicine Visit: 4/23/2020 Inés Peña DO    Next Office Visit: 1/8/2021  Next Telemedicine Visit: Visit date not found         Current Outpatient Medications:   •  alendronate (FOSAMAX) 70 mg tablet, Take 1 tablet (70 mg total) by mouth once a week. Take on an empty stomach and do not lie down for the next 30 min., Disp: 12 tablet, Rfl: 0  •  aspirin 81 mg enteric coated tablet, Take 81 mg by mouth daily., Disp: , Rfl:   •  atorvastatin (LIPITOR) 20 mg tablet, Take 1 tablet (20 mg total) by mouth nightly., Disp: 90 tablet, Rfl: 1  •  cholecalciferol, vitamin D3, 1,000 unit capsule, Take 2 capsules by mouth daily., Disp: , Rfl:   •  levothyroxine (SYNTHROID) 75 mcg tablet, Take 1 tablet (75 mcg total) by mouth daily. Take on an empty stomach., Disp: 90 tablet, Rfl: 1  •  multivitamin tablet, Take by mouth daily. w Vit C, Disp: , Rfl:       BP Readings from Last 3 Encounters:   06/08/18 106/74       Recent Lab results:  Lab Results   Component Value Date    CHOL 124 06/15/2018   ,   Lab Results   Component Value Date    HDL 64 06/15/2018   ,   Lab Results   Component Value Date    LDLCALC 51 06/15/2018   ,   Lab Results   Component Value Date    TRIG 46 06/15/2018        Lab Results   Component Value Date    GLUCOSE 79 06/15/2018   , No results found for: HGBA1C      Lab Results   Component Value Date    CREATININE 0.55 (L) 06/15/2018       Lab Results   Component Value Date    TSH 0.207 (L) 06/15/2018

## 2020-11-23 NOTE — TELEPHONE ENCOUNTER
Lavinia, would you please call the pt to schedule an appt; pt hasn't been seen since 4/23/20 and per office visit note, was due back in about 4 months, around 8/23/20 for medication follow up (can be a Telelmed visit). Pt does have a scheduled appt for 1/8/21. If leaving pt a message, please indicate that appts filling up quickly. If pt doesn’t schedule an appt, then I will send pended refill request to Dr Peña. Thank you!

## 2020-11-24 RX ORDER — ALENDRONATE SODIUM 70 MG/1
70 TABLET ORAL WEEKLY
Qty: 12 TABLET | Refills: 0 | Status: SHIPPED | OUTPATIENT
Start: 2020-11-24 | End: 2021-01-08 | Stop reason: SDUPTHER

## 2020-11-24 RX ORDER — LEVOTHYROXINE SODIUM 75 UG/1
75 TABLET ORAL
Qty: 90 TABLET | Refills: 0 | Status: SHIPPED | OUTPATIENT
Start: 2020-11-24 | End: 2021-01-08 | Stop reason: SDUPTHER

## 2020-11-24 NOTE — TELEPHONE ENCOUNTER
Medicine Refill Request    Last Office Visit: 6/8/2018  Last Telemedicine Visit: 4/23/2020 Inés Peña DO    Next Office Visit: 12/2/2020  Next Telemedicine Visit: Visit date not found         Current Outpatient Medications:   •  alendronate (FOSAMAX) 70 mg tablet, Take 1 tablet (70 mg total) by mouth once a week. Take on an empty stomach and do not lie down for the next 30 min., Disp: 12 tablet, Rfl: 0  •  aspirin 81 mg enteric coated tablet, Take 81 mg by mouth daily., Disp: , Rfl:   •  atorvastatin (LIPITOR) 20 mg tablet, Take 1 tablet (20 mg total) by mouth nightly., Disp: 90 tablet, Rfl: 1  •  cholecalciferol, vitamin D3, 1,000 unit capsule, Take 2 capsules by mouth daily., Disp: , Rfl:   •  levothyroxine (SYNTHROID) 75 mcg tablet, Take 1 tablet (75 mcg total) by mouth daily. Take on an empty stomach., Disp: 90 tablet, Rfl: 0  •  multivitamin tablet, Take by mouth daily. w Vit C, Disp: , Rfl:       BP Readings from Last 3 Encounters:   06/08/18 106/74       Recent Lab results:  Lab Results   Component Value Date    CHOL 124 06/15/2018   ,   Lab Results   Component Value Date    HDL 64 06/15/2018   ,   Lab Results   Component Value Date    LDLCALC 51 06/15/2018   ,   Lab Results   Component Value Date    TRIG 46 06/15/2018        Lab Results   Component Value Date    GLUCOSE 79 06/15/2018   , No results found for: HGBA1C      Lab Results   Component Value Date    CREATININE 0.55 (L) 06/15/2018       Lab Results   Component Value Date    TSH 0.207 (L) 06/15/2018

## 2020-12-01 NOTE — PROGRESS NOTES
Inés Peña DO  [facility]  Family Medicine  [address], [address]  [place] [address]  Phone: [phone]  Fax: [phone]     [facility] TELEMEDICINE ENCOUNTER  History of Present Illness/Chief Complaint     Dominga Dee is a 65 y.o. established patient at [facility]. This patient is participating in a telemedicine encounter due to inability or contraindication(s) to present to the office in-person.     This telemedicine visit will be billed to the patient's health insurance or to the patient directly (if this individual is self-insured). The patient verbalized an understanding that he/she will be responsible for any co-payments, deductibles, or co-insurances that apply to this telemedicine visit. This visit was not related to any in-person evaluation or appointment within the last seven days. In addition, the patient does not have an upcoming appointment with any of our providers within the next 24 hours.The patient has verbalized an understanding of the above, and has verbally consented to this virtual appointment. Time spent during this virtual encounter totaled: _15__ minutes.   Request for Consent:   Video Encounter   Hello, my name is Inés Peña DO.  Before we proceed, can you please verify your identification by telling me your full name and date of birth?  Can you tell me who is in the room with you?    You and I are about to have a telemedicine check-in or visit because you have requested it.  This is a live video-conference.  I am a real person, speaking to you in real time.  There is no one else with me on the video-conference.  However, when we use ([employer], Skype, etc) it is important for you to know that the video-conference may not be secure or private.  I am not recording this conversation and I am asking you not to record it.  This telemedicine visit will be billed to your health insurance or you, if you are self-insured.  You understand you  will be responsible for any copayments or coinsurances that apply to your telemedicine visit.  Communication platform used for this encounter:  Integrated Zoom via [employer] Video Visit     Before starting our telemedicine visit, I am required to get your consent for this virtual check-in or visit by telemedicine. Do you consent?      Patient Response to Request for Consent:  Yes     Dominga agrees to telemed visit with me today for FU thyroid, Chol, HTN, CAD, OSteoporosis -   Last seen 6/18 and telemed 4/2020  HTN  - BP off Metoprolol after seeing cardio in [place] - at home it is 130s/70-80s so no med added  BMI 19 at last OV  Diet - reg meals and good mix of foods  Exercise -walks 45+mins 6 dys/wk  Hypothyroid w TSH high in 2018 so lowered to 75 mcg Levothyroxine qd and BW done 1/2020 and thinks  that it was normal so BW ordered  Chol on Lipitor 20 qd  - BW to goal 2018 and 1/2020  thinks was normal so BW ordered  Cardio sees her for CAD / MI 8/15 and last FU 5/17 and stress tst neg 9/17 - last seen by [place] cardio 2/2020 w no concerns  Hx of low  Vit D-  on 2000 IU and BW ok 3/17  Immunoglobin G elevated and so was referred  to heme/onc and eval neg per 7/17 note and says had FU 2018 and no FU needed  Osteoporosis on DEXA this winter so Fosamax started ~12/19    HM-  -flu shot 10/2020  -Tdap 1/12  -[id number] ~2018  -[id number] wants to do at our appt nxt month  -Shingrix 6/18 and got second and actually did first over also so try to get dates  -Zostavax 2014  -PAP/gyn 5/15 w neg PAP/HPV and done 4/19 that was normal and told no further PAPs needed  -mammo 10/17 and thinks ~10/19 then last done there ~2/2020 and was due 6 mos so B/L diagnostic ordered to do now  -scope not done so FIT ordered and neg 5/2020  -DEXA 12/19 + and txn as above  -BW 6/18 and done 2/2020 in [place] so ordered full BW  -Hep C screen neg w heme in past  -EKG 9/13  -MWV 1/2020 w last dr and set up for nxt month  -falls screen neg 1/2020          Review of  Systems   Constitutional: Negative for fever and unexpected weight change.   HENT: Negative for ear pain and sore throat.         No URI   Respiratory: Negative for cough, shortness of breath and wheezing.    Cardiovascular: Negative for chest pain and leg swelling.   Gastrointestinal: Negative for abdominal pain, nausea and vomiting.        No change in Bms  No ANTONY   Endocrine: Negative for polyuria.   Genitourinary: Negative for dysuria, frequency and urgency.   Skin: Negative for rash.   Neurological: Negative for weakness.   Psychiatric/Behavioral: Negative for behavioral problems and dysphoric mood.       Past Medical Hx - Surgical Hx - Family Hx - Social Hx     The following have been reviewed and updated as appropriate in this visit:  Allergies  Meds  Problems         Past Medical History:   Diagnosis Date   • Allergic rhinitis    • Coronary artery disease    • Hyperproteinemia     w neg heme eval   • Hypertension    • Hypothyroidism    • Lipid disorder        Past Surgical History:   Procedure Laterality Date   • CARDIAC CATHETERIZATION      w 90% occlusion 8/15   • CHOLECYSTECTOMY     • KNEE SURGERY      ORIF patella   • TONSILLECTOMY         Family History   Problem Relation Age of Onset   • Osteoporosis Biological Mother    • Heart disease Biological Father    • Hypertension Biological Father    • Heart disease Biological Brother        Social History     Socioeconomic History   • Marital status:      Spouse name: Not on file   • Number of children: 2   • Years of education: Not on file   • Highest education level: Not on file   Occupational History   • 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   Social Needs   • Financial resource strain: Not on file   • Food insecurity     Worry: Not on file     Inability: Not on file   • Transportation needs     Medical: Not on file     Non-medical: Not on file   Tobacco Use   • Smoking status: Never Smoker   • Smokeless tobacco: Never Used   Substance and Sexual  Activity   • Alcohol use: No   • Drug use: Not on file   • Sexual activity: Not on file   Lifestyle   • Physical activity     Days per week: Not on file     Minutes per session: Not on file   • Stress: Not on file   Relationships   • Social connections     Talks on phone: Not on file     Gets together: Not on file     Attends Shinto service: Not on file     Active member of club or organization: Not on file     Attends meetings of clubs or organizations: Not on file     Relationship status: Not on file   • Intimate partner violence     Fear of current or ex partner: Not on file     Emotionally abused: Not on file     Physically abused: Not on file     Forced sexual activity: Not on file   Other Topics Concern   • Not on file   Social History Narrative   • Not on file        Allergies & Medications     Allergies   Allergen Reactions   • No Known Allergies        Current Outpatient Medications   Medication Sig Dispense Refill   • alendronate (FOSAMAX) 70 mg tablet Take 1 tablet (70 mg total) by mouth once a week. Take on an empty stomach and do not lie down for the next 30 min. 12 tablet 0   • aspirin 81 mg enteric coated tablet Take 81 mg by mouth daily.     • atorvastatin (LIPITOR) 20 mg tablet Take 1 tablet (20 mg total) by mouth nightly. 90 tablet 1   • cholecalciferol, vitamin D3, 1,000 unit capsule Take 2 capsules by mouth daily.     • levothyroxine (SYNTHROID) 75 mcg tablet Take 1 tablet (75 mcg total) by mouth daily. Take on an empty stomach. 90 tablet 0   • multivitamin tablet Take by mouth daily. w Vit C       No current facility-administered medications for this visit.         Laboratory Results     Lab Results   Component Value Date    WBC 3.5 06/15/2018    HGB 11.7 06/15/2018    HCT 36.2 06/15/2018     06/15/2018        Lab Results   Component Value Date    GLUCOSE 79 06/15/2018    CALCIUM 9.2 04/12/2017     06/15/2018    K 4.7 06/15/2018    CO2 24 06/15/2018     06/15/2018    BUN 6  (L) 06/15/2018    CREATININE 0.55 (L) 06/15/2018    ALKPHOS 72 06/15/2018    AST 32 06/15/2018    ALT 24 06/15/2018    BILITOT 0.3 06/15/2018    ALBUMIN 4.3 06/15/2018       Lab Results   Component Value Date    CHOL 124 06/15/2018     Lab Results   Component Value Date    HDL 64 06/15/2018     Lab Results   Component Value Date    LDLCALC 51 06/15/2018     Lab Results   Component Value Date    TRIG 46 06/15/2018       Lab Results   Component Value Date    TSH 0.207 (L) 06/15/2018       No results found for: HGBA1C    Lab Results   Component Value Date    HEPCAB NEGATIVE 05/08/2017       Immunization History   Administered Date(s) Administered   • Influenza Split High Dose Preservative Free IM 09/30/2014   • Influenza Split Preservative Free ID 09/10/2013   • Influenza Vaccine High Dose 65 And Older 10/30/2020   • Influenza Vaccine Quadrivalent 3 Yr And Older 08/01/2016   • Influenza Vaccine Quadrivalent Preservative Free 6-35 Months 09/10/2015   • Influenza, Quadrivalent 11/09/2017   • Influenza, Unspecified 09/10/2015   • Tdap 01/16/2012   • Zoster 12/22/2014   • Zoster Vaccine Recombinant Adjuvanted (Shingrix) 06/08/2018       Health Maintenance Topics with due status: Overdue       Topic Date Due    Mammogram 10/13/2019     Health Maintenance Topics with due status: Postponed       Topic Postponed Until    Pneumococcal (65 years and older) 01/08/2021 (Originally 4/16/2019)    Zoster Vaccine 11/30/2021 (Originally 8/3/2018)     Health Maintenance Topics with due status: Not Due       Topic Last Completion Date    DTaP, Tdap, and Td Vaccines 01/16/2012    DEXA Scan 12/01/2019    Medicare Annual Wellness Visit 01/01/2020    Colon Cancer Screening: Annual FIT 05/05/2020     Health Maintenance Topics with due status: Completed       Topic Last Completion Date    Hepatitis C Screening 11/14/2017    Influenza Vaccine 10/30/2020     Health Maintenance Topics with due status: Addressed       Topic Date Due    Annual  Falls Risk Screening Addressed     Health Maintenance Topics with due status: Aged Out       Topic Date Due    Meningococcal ACWY Aged Out    HIB Vaccines Aged Out    IPV Vaccines Aged Out    HPV Vaccines Aged Out    Pneumococcal Aged Out     Health Maintenance Topics with due status: Discontinued       Topic Date Due    Varicella Vaccines Discontinued        Assessment and Plan     Assessment/Plan     Problem List Items Addressed This Visit        Circulatory    CAD (coronary artery disease)    Overview     Overview: stress echo neg 9/17         Current Assessment & Plan     See History of Present Illness section for assessment and plan on all medical Diagnoses         Relevant Orders    Comprehensive metabolic panel    Lipid panel    Hypertension    Relevant Orders    Comprehensive metabolic panel    Lipid panel       Digestive    Vitamin D deficiency    Overview     Overview:             Musculoskeletal    Age-related osteoporosis without current pathological fracture       Endocrine/Metabolic    Hypothyroidism - Primary    Overview     Overview:          Relevant Orders    TSH w reflex FT4    Lipid disorder    Relevant Orders    Comprehensive metabolic panel    Lipid panel       Other    Encounter for screening    Relevant Orders    Comprehensive metabolic panel    Lipid panel    BI DIAGNOSTIC MAMMOGRAM BILATERAL          Follow-up: keep appt nxt month for LAY Peña DO    12/2/2020

## 2020-12-02 ENCOUNTER — TELEMEDICINE (OUTPATIENT)
Dept: PRIMARY CARE | Facility: CLINIC | Age: 65
End: 2020-12-02
Payer: COMMERCIAL

## 2020-12-02 DIAGNOSIS — E78.9 LIPID DISORDER: ICD-10-CM

## 2020-12-02 DIAGNOSIS — I10 ESSENTIAL HYPERTENSION: ICD-10-CM

## 2020-12-02 DIAGNOSIS — E55.9 VITAMIN D DEFICIENCY: ICD-10-CM

## 2020-12-02 DIAGNOSIS — Z13.9 ENCOUNTER FOR SCREENING: ICD-10-CM

## 2020-12-02 DIAGNOSIS — I25.10 CORONARY ARTERY DISEASE INVOLVING NATIVE CORONARY ARTERY OF NATIVE HEART WITHOUT ANGINA PECTORIS: ICD-10-CM

## 2020-12-02 DIAGNOSIS — M81.0 AGE-RELATED OSTEOPOROSIS WITHOUT CURRENT PATHOLOGICAL FRACTURE: ICD-10-CM

## 2020-12-02 DIAGNOSIS — E03.9 ACQUIRED HYPOTHYROIDISM: Primary | ICD-10-CM

## 2020-12-02 PROCEDURE — 99213 OFFICE O/P EST LOW 20 MIN: CPT | Mod: 95 | Performed by: FAMILY MEDICINE

## 2020-12-02 ASSESSMENT — ENCOUNTER SYMPTOMS
COUGH: 0
ROS GI COMMENTS: NO CHANGE IN BMS  NO GER
FREQUENCY: 0
NAUSEA: 0
SHORTNESS OF BREATH: 0
ABDOMINAL PAIN: 0
DYSPHORIC MOOD: 0
UNEXPECTED WEIGHT CHANGE: 0
WEAKNESS: 0
SORE THROAT: 0
VOMITING: 0
DYSURIA: 0
WHEEZING: 0
FEVER: 0

## 2020-12-12 LAB
ALBUMIN SERPL-MCNC: 4.5 G/DL (ref 3.8–4.8)
ALBUMIN/GLOB SERPL: 1.2 {RATIO} (ref 1.2–2.2)
ALP SERPL-CCNC: 78 IU/L (ref 39–117)
ALT SERPL-CCNC: 19 IU/L (ref 0–32)
AST SERPL-CCNC: 28 IU/L (ref 0–40)
BILIRUB SERPL-MCNC: 0.4 MG/DL (ref 0–1.2)
BUN SERPL-MCNC: 9 MG/DL (ref 8–27)
BUN/CREAT SERPL: 15 (ref 12–28)
CALCIUM SERPL-MCNC: 9.6 MG/DL (ref 8.7–10.3)
CHLORIDE SERPL-SCNC: 101 MMOL/L (ref 96–106)
CHOLEST SERPL-MCNC: 155 MG/DL (ref 100–199)
CO2 SERPL-SCNC: 26 MMOL/L (ref 20–29)
CREAT SERPL-MCNC: 0.59 MG/DL (ref 0.57–1)
GLOBULIN SER CALC-MCNC: 3.9 G/DL (ref 1.5–4.5)
GLUCOSE SERPL-MCNC: 82 MG/DL (ref 65–99)
HDLC SERPL-MCNC: 67 MG/DL
LAB CORP EGFR IF AFRICN AM: 110 ML/MIN/1.73
LAB CORP EGFR IF NONAFRICN AM: 96 ML/MIN/1.73
LDLC SERPL CALC-MCNC: 74 MG/DL (ref 0–99)
POTASSIUM SERPL-SCNC: 4.4 MMOL/L (ref 3.5–5.2)
PROT SERPL-MCNC: 8.4 G/DL (ref 6–8.5)
SODIUM SERPL-SCNC: 139 MMOL/L (ref 134–144)
T4 FREE SERPL-MCNC: 1.16 NG/DL (ref 0.82–1.77)
TRIGL SERPL-MCNC: 69 MG/DL (ref 0–149)
TSH SERPL DL<=0.005 MIU/L-ACNC: 2.54 UIU/ML (ref 0.45–4.5)
VLDLC SERPL CALC-MCNC: 14 MG/DL (ref 5–40)

## 2020-12-14 ENCOUNTER — TELEPHONE (OUTPATIENT)
Dept: PRIMARY CARE | Facility: CLINIC | Age: 65
End: 2020-12-14

## 2020-12-14 NOTE — TELEPHONE ENCOUNTER
Looking for a precert for a diagnostic mammogram.  The patient can be reached at [phone].  The patient has [employer] insurance.

## 2020-12-14 NOTE — RESULT ENCOUNTER NOTE
Dominga, Here are your labwork results -   Your thyroid,blood sugar , kidneys, liver, electrolytes are normal.   Cholesterol and Triglycerides are normal. So no medication change needed.  Have a good rest of your day!   - Dr ANDREA

## 2020-12-15 DIAGNOSIS — Z12.31 ENCOUNTER FOR SCREENING MAMMOGRAM FOR MALIGNANT NEOPLASM OF BREAST: Primary | ICD-10-CM

## 2021-01-08 ENCOUNTER — OFFICE VISIT (OUTPATIENT)
Dept: PRIMARY CARE | Facility: CLINIC | Age: 66
End: 2021-01-08
Payer: COMMERCIAL

## 2021-01-08 VITALS
HEIGHT: 65 IN | OXYGEN SATURATION: 99 % | DIASTOLIC BLOOD PRESSURE: 62 MMHG | HEART RATE: 84 BPM | RESPIRATION RATE: 16 BRPM | BODY MASS INDEX: 22.92 KG/M2 | SYSTOLIC BLOOD PRESSURE: 118 MMHG | WEIGHT: 137.6 LBS

## 2021-01-08 DIAGNOSIS — E78.9 LIPID DISORDER: ICD-10-CM

## 2021-01-08 DIAGNOSIS — E03.9 ACQUIRED HYPOTHYROIDISM: Primary | ICD-10-CM

## 2021-01-08 DIAGNOSIS — Z13.9 ENCOUNTER FOR SCREENING: ICD-10-CM

## 2021-01-08 DIAGNOSIS — M81.0 AGE-RELATED OSTEOPOROSIS WITHOUT CURRENT PATHOLOGICAL FRACTURE: ICD-10-CM

## 2021-01-08 PROBLEM — I10 HYPERTENSION: Status: RESOLVED | Noted: 2018-06-08 | Resolved: 2021-01-08

## 2021-01-08 PROCEDURE — 90732 PPSV23 VACC 2 YRS+ SUBQ/IM: CPT | Performed by: FAMILY MEDICINE

## 2021-01-08 PROCEDURE — 90471 IMMUNIZATION ADMIN: CPT | Performed by: FAMILY MEDICINE

## 2021-01-08 PROCEDURE — 99214 OFFICE O/P EST MOD 30 MIN: CPT | Mod: 25 | Performed by: FAMILY MEDICINE

## 2021-01-08 RX ORDER — LEVOTHYROXINE SODIUM 75 UG/1
75 TABLET ORAL
Qty: 90 TABLET | Refills: 1 | Status: SHIPPED | OUTPATIENT
Start: 2021-01-08 | End: 2021-10-11

## 2021-01-08 RX ORDER — ATORVASTATIN CALCIUM 20 MG/1
20 TABLET, FILM COATED ORAL NIGHTLY
Qty: 90 TABLET | Refills: 1 | Status: SHIPPED | OUTPATIENT
Start: 2021-01-08 | End: 2021-07-06

## 2021-01-08 RX ORDER — ALENDRONATE SODIUM 70 MG/1
70 TABLET ORAL WEEKLY
Qty: 12 TABLET | Refills: 1 | Status: SHIPPED | OUTPATIENT
Start: 2021-01-08 | End: 2021-09-22

## 2021-01-08 ASSESSMENT — ENCOUNTER SYMPTOMS
DYSURIA: 0
UNEXPECTED WEIGHT CHANGE: 0
FREQUENCY: 0
DYSPHORIC MOOD: 0
PALPITATIONS: 0
FEVER: 0
ROS GI COMMENTS: NO CHANGE IN BMS  NO GER
SORE THROAT: 0
COUGH: 0
VOMITING: 0
SHORTNESS OF BREATH: 0
ABDOMINAL PAIN: 0
NAUSEA: 0
WHEEZING: 0
WEAKNESS: 0

## 2021-01-08 NOTE — PROGRESS NOTES
Inés Peña DO    [facility]  [address], [address]  [place] [address]  Phone: [phone]  Fax: [phone]     History of Present Illness   Subjective     Patient ID: Dominga Dee is a 65 y.o. female.    Dominga is here  for FU thyroid, Chol, HTN, CAD, OSteoporosis -   Last seen 6/18 and telemeds 4,12/2020  HTN  - BP is normal today off med so nothing added  BP ok while in [place] and has cuff she chks at home about once a week and 130-140/70s so will cont to watch and call if going over 140/90 consistently  BMI 22  Diet - reg dinner , light snack and good foods  Exercise -walks 3 mos 5- 6 dys/wk  Hypothyroid on 75 mcg Levothyroxine qd and BW 12/2020 was normal so no med change   Chol on Lipitor 20 qd  - BW to goal 12/2020 so no med change   Cardio saw her for CAD / MI 8/15 and last FU 5/17, stress tst neg 9/17 - last seen by [place] cardio 2/2020 w no concerns and told to FU prn  Hx of low  Vit D-  on 2000 IU and BW ok 3/17  Immunoglobin G elevated and so was referred  to heme/onc and eval neg per 7/17 note and says had FU 2018 and no FU needed  Osteoporosis on DEXA  so Fosamax started ~12/19 that she is tolerating    HM-  -flu shot 10/2020  -Tdap 1/12  -[id number] ~2018  -[id number] 1/21 today  -Shingrix 1/2020, 3/2020  -Zostavax 2014  -PAP/gyn 5/15 w neg PAP/HPV and done 4/19 that was normal and told no further PAPs needed  -mammo 10/17 and thinks ~10/19 then last done there ~2/2020 and was due 6 mos so B/L diagnostic and due to coverage issues want to start w screening test and doesn't mind coming back for FUs, hasn't been able to get films moved here  -scope not done so FIT ordered and neg 5/2020 so nxt ordered  -DEXA 12/19 + and txn as above so due 12/21  -BW 12/2020 reviewed  -Hep C screen neg w heme in past  -EKG 9/13  -MWV 1/2020 w last dr patel set up   -falls screen neg 1/2020         Past Medical/Surgical/Family/Social History     The following have been  reviewed and updated as appropriate in this visit:  Allergies  Meds  Problems         Past Medical History:   Diagnosis Date   • Allergic rhinitis    • Coronary artery disease    • Hyperproteinemia     w neg heme eval   • Hypertension    • Hypothyroidism    • Lipid disorder        Past Surgical History:   Procedure Laterality Date   • CARDIAC CATHETERIZATION      w 90% occlusion 8/15   • CHOLECYSTECTOMY     • KNEE SURGERY      ORIF patella   • TONSILLECTOMY         Family History   Problem Relation Age of Onset   • Osteoporosis Biological Mother    • Heart disease Biological Father    • Hypertension Biological Father    • Heart disease Biological Brother        Social History     Tobacco Use   • Smoking status: Never Smoker   • Smokeless tobacco: Never Used   Substance Use Topics   • Alcohol use: No   • Drug use: Not on file      Allergies and Medications     Allergies   Allergen Reactions   • No Known Allergies          Outpatient Encounter Medications as of 1/8/2021:   •  alendronate (FOSAMAX) 70 mg tablet, Take 1 tablet (70 mg total) by mouth once a week. Take on an empty stomach and do not lie down for the next 30 min.  •  aspirin 81 mg enteric coated tablet, Take 81 mg by mouth daily.  •  atorvastatin (LIPITOR) 20 mg tablet, Take 1 tablet (20 mg total) by mouth nightly.  •  cholecalciferol, vitamin D3, 1,000 unit capsule, Take 2 capsules by mouth daily.  •  levothyroxine (SYNTHROID) 75 mcg tablet, Take 1 tablet (75 mcg total) by mouth daily. Take on an empty stomach.  •  multivitamin tablet, Take by mouth daily. w Vit C  •  [DISCONTINUED] alendronate (FOSAMAX) 70 mg tablet, Take 1 tablet (70 mg total) by mouth once a week. Take on an empty stomach and do not lie down for the next 30 min.  •  [DISCONTINUED] atorvastatin (LIPITOR) 20 mg tablet, Take 1 tablet (20 mg total) by mouth nightly.  •  [DISCONTINUED] levothyroxine (SYNTHROID) 75 mcg tablet, Take 1 tablet (75 mcg total) by mouth daily. Take on an empty  "stomach.   Review of Systems       Review of Systems   Constitutional: Negative for fever and unexpected weight change.   HENT: Negative for ear pain and sore throat.         No URI   Respiratory: Negative for cough, shortness of breath and wheezing.    Cardiovascular: Negative for chest pain, palpitations and leg swelling.   Gastrointestinal: Negative for abdominal pain, nausea and vomiting.        No change in Bms  No ANTONY   Endocrine: Negative for polyuria.   Genitourinary: Negative for dysuria, frequency and urgency.   Skin: Negative for rash.   Neurological: Negative for weakness.   Psychiatric/Behavioral: Negative for behavioral problems and dysphoric mood.      Physical Examination       Objective     Vitals:    01/08/21 1433   BP: 118/62   Pulse: 84   Resp: 16   SpO2: 99%       Wt Readings from Last 3 Encounters:   01/08/21 62.4 kg (137 lb 9.6 oz)   06/08/18 53.6 kg (118 lb 3.2 oz)       Body mass index is 22.9 kg/m².    Ht Readings from Last 3 Encounters:   01/08/21 1.651 m (5' 5\")   06/08/18 1.671 m (5' 5.8\")       BP Readings from Last 3 Encounters:   01/08/21 118/62   06/08/18 106/74       Physical Exam  Vitals signs and nursing note reviewed.   Constitutional:       General: She is not in acute distress.     Appearance: Normal appearance. She is well-developed. She is not ill-appearing or diaphoretic.   HENT:      Head: Normocephalic and atraumatic.   Neck:      Musculoskeletal: Neck supple.      Thyroid: No thyromegaly.   Cardiovascular:      Rate and Rhythm: Normal rate and regular rhythm.      Heart sounds: Normal heart sounds.   Pulmonary:      Effort: Pulmonary effort is normal.      Breath sounds: Normal breath sounds.   Abdominal:      General: Bowel sounds are normal.      Palpations: Abdomen is soft.      Tenderness: There is no abdominal tenderness.   Lymphadenopathy:      Cervical: No cervical adenopathy.   Skin:     General: Skin is warm and dry.   Neurological:      Mental Status: She is " alert and oriented to person, place, and time. Mental status is at baseline.      Motor: No abnormal muscle tone.   Psychiatric:         Mood and Affect: Mood normal.         Behavior: Behavior normal.        Laboratory Results     Lab Results   Component Value Date    WBC 3.5 06/15/2018    HGB 11.7 06/15/2018    HCT 36.2 06/15/2018    MCV 94 06/15/2018     06/15/2018          Chemistry        Component Value Date/Time     12/11/2020 1118     04/12/2017 1536    K 4.4 12/11/2020 1118    K 4.2 04/12/2017 1536     12/11/2020 1118     04/12/2017 1536    CO2 26 12/11/2020 1118    CO2 25 04/12/2017 1536    BUN 9 12/11/2020 1118    BUN 9 04/12/2017 1536    CREATININE 0.59 12/11/2020 1118    CREATININE 0.5 (L) 04/12/2017 1536        Component Value Date/Time    CALCIUM 9.2 04/12/2017 1536    ALKPHOS 78 12/11/2020 1118    ALKPHOS 68 04/12/2017 1536    AST 28 12/11/2020 1118    AST 30 04/12/2017 1536    ALT 19 12/11/2020 1118    ALT 25 04/12/2017 1536    BILITOT 0.4 12/11/2020 1118    BILITOT 0.5 04/12/2017 1536            Lab Results   Component Value Date    GLUCOSE 82 12/11/2020    CALCIUM 9.2 04/12/2017     12/11/2020    K 4.4 12/11/2020    CO2 26 12/11/2020     12/11/2020    BUN 9 12/11/2020    CREATININE 0.59 12/11/2020       Lab Results   Component Value Date    CHOL 155 12/11/2020    CHOL 124 06/15/2018    CHOL 118 (L) 03/11/2017     Lab Results   Component Value Date    HDL 67 12/11/2020    HDL 64 06/15/2018    HDL 65 03/11/2017     Lab Results   Component Value Date    LDLCALC 74 12/11/2020    LDLCALC 51 06/15/2018    LDLCALC 42 03/11/2017     Lab Results   Component Value Date    TRIG 69 12/11/2020    TRIG 46 06/15/2018    TRIG 56 03/11/2017     No results found for: CHOLHDL    Lab Results   Component Value Date    TSH 2.540 12/11/2020       No results found for: HGBA1C    Lab Results   Component Value Date    HEPCAB NEGATIVE 05/08/2017         Immunization History    Administered Date(s) Administered   • Influenza Split High Dose Preservative Free IM 09/30/2014   • Influenza Split Preservative Free ID 09/10/2013   • Influenza Vaccine High Dose 65 And Older 10/30/2020   • Influenza Vaccine Quadrivalent 3 Yr And Older 08/01/2016   • Influenza Vaccine Quadrivalent Preservative Free 6-35 Months 09/10/2015   • Influenza, Quadrivalent 11/09/2017   • Influenza, Unspecified 09/10/2015   • Pneumococcal Polysaccharide 01/08/2021   • Tdap 01/16/2012   • Zoster 12/22/2014   • Zoster Vaccine Recombinant Adjuvanted (Shingrix) 06/08/2018, 01/01/2020, 03/01/2020         Health Maintenance Topics with due status: Overdue       Topic Date Due    Mammogram 10/13/2019    Medicare Annual Wellness Visit 01/01/2021    Annual Falls Risk Screening 01/01/2021     Health Maintenance Topics with due status: Postponed       Topic Postponed Until    Pneumococcal (65 years and older) 01/08/2021 (Originally 4/16/2019)    Zoster Vaccine 11/30/2021 (Originally 8/3/2018)     Health Maintenance Topics with due status: Not Due       Topic Last Completion Date    DTaP, Tdap, and Td Vaccines 01/16/2012    DEXA Scan 12/01/2019    Colon Cancer Screening: Annual FIT 05/05/2020     Health Maintenance Topics with due status: Completed       Topic Last Completion Date    Hepatitis C Screening 11/14/2017    Influenza Vaccine 10/30/2020     Health Maintenance Topics with due status: Aged Out       Topic Date Due    Meningococcal ACWY Aged Out    HIB Vaccines Aged Out    IPV Vaccines Aged Out    HPV Vaccines Aged Out    Pneumococcal Aged Out     Health Maintenance Topics with due status: Discontinued       Topic Date Due    Varicella Vaccines Discontinued          Assessment and Plan       Assessment/Plan     Problem List Items Addressed This Visit        Musculoskeletal    Age-related osteoporosis without current pathological fracture       Endocrine/Metabolic    Hypothyroidism - Primary    Overview     Overview:           Current Assessment & Plan     See History of Present Illness section for assessment and plan on all medical Diagnoses         Relevant Medications    levothyroxine (SYNTHROID) 75 mcg tablet    Lipid disorder       Other    Encounter for screening    Relevant Orders    Fecal Immunochemical          Return in about 6 months (around 7/8/2021) for MWV, medication follow up.    Orders Placed This Encounter   Procedures   • Pneumococcal polysaccharide vaccine 23-valent greater than or equal to 3yo subcutaneous/IM   • Fecal Immunochemical     Standing Status:   Future     Number of Occurrences:   1     Standing Expiration Date:   1/8/2022     Order Specific Question:   Release to patient     Answer:   Immediate            Inés Peña, DO  1/8/2021

## 2021-02-12 ENCOUNTER — HOSPITAL ENCOUNTER (OUTPATIENT)
Dept: RADIOLOGY | Facility: HOSPITAL | Age: 66
Discharge: HOME | End: 2021-02-12
Attending: FAMILY MEDICINE
Payer: COMMERCIAL

## 2021-02-12 DIAGNOSIS — Z12.31 ENCOUNTER FOR SCREENING MAMMOGRAM FOR MALIGNANT NEOPLASM OF BREAST: ICD-10-CM

## 2021-02-12 PROCEDURE — 77067 SCR MAMMO BI INCL CAD: CPT

## 2021-02-12 NOTE — RESULT ENCOUNTER NOTE
Good news,  Your mammogram is normal so you will be due for your next in one year.  Stay healthy,  Dr ANDREA

## 2021-03-26 ENCOUNTER — IMMUNIZATION (OUTPATIENT)
Dept: IMMUNIZATION | Facility: CLINIC | Age: 66
End: 2021-03-26

## 2021-04-30 ENCOUNTER — IMMUNIZATION (OUTPATIENT)
Dept: IMMUNIZATION | Facility: CLINIC | Age: 66
End: 2021-04-30
Attending: NURSE PRACTITIONER

## 2021-07-02 NOTE — TELEPHONE ENCOUNTER
Medicine Refill Request    Last Office Visit: 1/8/2021  Last Telemedicine Visit: 12/2/2020 Inés Peña DO    Next Office Visit: 7/9/2021  Next Telemedicine Visit: Visit date not found         Current Outpatient Medications:   •  alendronate (FOSAMAX) 70 mg tablet, Take 1 tablet (70 mg total) by mouth once a week. Take on an empty stomach and do not lie down for the next 30 min., Disp: 12 tablet, Rfl: 1  •  aspirin 81 mg enteric coated tablet, Take 81 mg by mouth daily., Disp: , Rfl:   •  atorvastatin (LIPITOR) 20 mg tablet, Take 1 tablet (20 mg total) by mouth nightly., Disp: 90 tablet, Rfl: 1  •  cholecalciferol, vitamin D3, 1,000 unit capsule, Take 2 capsules by mouth daily., Disp: , Rfl:   •  levothyroxine (SYNTHROID) 75 mcg tablet, Take 1 tablet (75 mcg total) by mouth daily. Take on an empty stomach., Disp: 90 tablet, Rfl: 1  •  multivitamin tablet, Take by mouth daily. w Vit C, Disp: , Rfl:       BP Readings from Last 3 Encounters:   01/08/21 118/62   06/08/18 106/74       Recent Lab results:  Lab Results   Component Value Date    CHOL 155 12/11/2020   ,   Lab Results   Component Value Date    HDL 67 12/11/2020   ,   Lab Results   Component Value Date    LDLCALC 74 12/11/2020   ,   Lab Results   Component Value Date    TRIG 69 12/11/2020        Lab Results   Component Value Date    GLUCOSE 82 12/11/2020   , No results found for: HGBA1C      Lab Results   Component Value Date    CREATININE 0.59 12/11/2020       Lab Results   Component Value Date    TSH 2.540 12/11/2020

## 2021-07-06 RX ORDER — ATORVASTATIN CALCIUM 20 MG/1
TABLET, FILM COATED ORAL
Qty: 90 TABLET | Refills: 1 | Status: SHIPPED | OUTPATIENT
Start: 2021-07-06 | End: 2021-12-20

## 2021-07-09 ENCOUNTER — OFFICE VISIT (OUTPATIENT)
Dept: PRIMARY CARE | Facility: CLINIC | Age: 66
End: 2021-07-09
Payer: COMMERCIAL

## 2021-07-09 VITALS
WEIGHT: 138.2 LBS | OXYGEN SATURATION: 98 % | HEIGHT: 65 IN | RESPIRATION RATE: 16 BRPM | DIASTOLIC BLOOD PRESSURE: 80 MMHG | HEART RATE: 91 BPM | BODY MASS INDEX: 23.03 KG/M2 | SYSTOLIC BLOOD PRESSURE: 120 MMHG

## 2021-07-09 DIAGNOSIS — E03.9 ACQUIRED HYPOTHYROIDISM: ICD-10-CM

## 2021-07-09 DIAGNOSIS — Z00.00 ENCOUNTER FOR GENERAL ADULT MEDICAL EXAMINATION WITHOUT ABNORMAL FINDINGS: Primary | ICD-10-CM

## 2021-07-09 DIAGNOSIS — E78.9 LIPID DISORDER: ICD-10-CM

## 2021-07-09 DIAGNOSIS — M81.0 AGE-RELATED OSTEOPOROSIS WITHOUT CURRENT PATHOLOGICAL FRACTURE: ICD-10-CM

## 2021-07-09 PROCEDURE — 99213 OFFICE O/P EST LOW 20 MIN: CPT | Mod: 25 | Performed by: FAMILY MEDICINE

## 2021-07-09 PROCEDURE — 99397 PER PM REEVAL EST PAT 65+ YR: CPT | Performed by: FAMILY MEDICINE

## 2021-07-09 PROCEDURE — 3008F BODY MASS INDEX DOCD: CPT | Performed by: FAMILY MEDICINE

## 2021-07-09 ASSESSMENT — ENCOUNTER SYMPTOMS
DIARRHEA: 0
COUGH: 0
WEAKNESS: 0
RHINORRHEA: 0
EYE DISCHARGE: 0
VOMITING: 0
WHEEZING: 0
CONSTIPATION: 0
BLOOD IN STOOL: 0
DYSPHORIC MOOD: 0
UNEXPECTED WEIGHT CHANGE: 0
ARTHRALGIAS: 0
ABDOMINAL PAIN: 0
HEADACHES: 0
SORE THROAT: 0
NAUSEA: 0
FREQUENCY: 0
ADENOPATHY: 0
APPETITE CHANGE: 0
SEIZURES: 0
DYSURIA: 0
SHORTNESS OF BREATH: 0
FATIGUE: 0
FEVER: 0

## 2021-07-09 NOTE — ASSESSMENT & PLAN NOTE
-Eat diet with regular meals including 2-4 fruit servings , 2-4 vegetable servings, whole grains and lean protien each day  -control portions of carbs and keep down fats and sugars in diet  -exercise for 150 mins per week of cardio or more and 1-2 days per week of something strengthening like yoga, pilates or lifting  -wear sunblock w SPF of 30 or higher  in sun to protect your skin, and reapply often  -avoid all tobacco products  -limit alcohol and caffiene  -aim to get 6-8 hrs of sleep each night  -see your eye doctor every 2-3 yrs  -see your dentist every 6-12 mos  -complete labwork as directed  FU for physical in one year

## 2021-07-09 NOTE — PROGRESS NOTES
Inés Peña DO    [facility]  [address]  [place] [address]  Phone: [phone]  Fax: [phone]     History of Present Illness   Subjective     Patient ID: Dominga Dee is a 66 y.o. female.    Dominga is here  for Physical and FU thyroid, Chol, HTN, CAD, OSteoporosis -   Last seen by me on 1/21  BP is normal  off med so nothing added  Home cuff readings around 130/80- call if going over 140/90 consistently  BMI 23  Wt similar  Diet -  3 meals and good food mix  Exercise -walks for 45 mins  6 dys/wk  Eye dr exam w glasses UTD  Dentist UTD  Hypothyroid on 75 mcg Levothyroxine qd and BW 12/2020 was normal so BW ordered  Chol on Lipitor 20 qd  - BW to goal 12/2020 so nxt ordered   Cardio saw her for CAD / MI 8/15 and last FU 5/17, stress tst neg 9/17 - last seen by [place] cardio 2/2020 w no concerns and told to FU prn, no symptoms now  Hx of low  Vit D-  on 2000 IU and BW ok 3/17  Immunoglobin G elevated and so was referred  to heme/onc and eval neg per 7/17 note and says had FU 2018 and no FU needed/prn  Osteoporosis on DEXA  on Fosamax since ~12/19 so FU scan for winter ordered    HM-  -flu shot 10/2020  -Tdap 1/12 so due in 2022  -Cormjra09 ~2018 so due 2023  -[id number] 1/21   -Shingrix 1/2020, 3/2020  -Zostavax 2014  -COVID vaccines 3/21, 4/21  -PAP/gyn 5/15 w neg PAP/HPV and done 4/19 that was normal and told no further PAPs needed  -mammo 2/21 so ordered   -scope not done so FIT ordered and neg 5/2020 so reordered to do now  -DEXA 12/19 + and txn as above so ordered for 12/21  -BW 12/2020 so ordered for 12/21  -Hep C screen neg w heme in past  -EKG 9/13  -MWV 1/2020 w last  but Physical covered w insurance now so done today 7/21  -falls screen neg 7/21 today         Past Medical/Surgical/Family/Social History     The following have been reviewed and updated as appropriate in this visit:  Allergies  Meds  Problems         Past Medical History:    Diagnosis Date   • Allergic rhinitis    • Coronary artery disease    • Hyperproteinemia     w neg heme eval   • Hypertension    • Hypothyroidism    • Lipid disorder        Past Surgical History:   Procedure Laterality Date   • CARDIAC CATHETERIZATION      w 90% occlusion 8/15   • CHOLECYSTECTOMY     • KNEE SURGERY      ORIF patella   • TONSILLECTOMY         Family History   Problem Relation Age of Onset   • Osteoporosis Biological Mother    • Heart disease Biological Father    • Hypertension Biological Father    • Heart disease Biological Brother        Social History     Tobacco Use   • Smoking status: Never Smoker   • Smokeless tobacco: Never Used   Substance Use Topics   • Alcohol use: No   • Drug use: Never      Allergies and Medications     Allergies   Allergen Reactions   • No Known Allergies          Outpatient Encounter Medications as of 7/9/2021:   •  alendronate (FOSAMAX) 70 mg tablet, Take 1 tablet (70 mg total) by mouth once a week. Take on an empty stomach and do not lie down for the next 30 min.  •  aspirin 81 mg enteric coated tablet, Take 81 mg by mouth daily.  •  atorvastatin (LIPITOR) 20 mg tablet, TAKE 1 TABLET BY MOUTH EVERY DAY AT NIGHT  •  cholecalciferol, vitamin D3, 1,000 unit capsule, Take 2 capsules by mouth daily.  •  levothyroxine (SYNTHROID) 75 mcg tablet, Take 1 tablet (75 mcg total) by mouth daily. Take on an empty stomach.  •  multivitamin tablet, Take by mouth daily. w Vit C  •  [DISCONTINUED] atorvastatin (LIPITOR) 20 mg tablet, Take 1 tablet (20 mg total) by mouth nightly.   Review of Systems       Review of Systems   Constitutional: Negative for appetite change, fatigue, fever and unexpected weight change.   HENT: Negative for congestion, ear pain, hearing loss, rhinorrhea and sore throat.    Eyes: Negative for discharge and visual disturbance.   Respiratory: Negative for cough, shortness of breath and wheezing.    Cardiovascular: Negative for chest pain and leg swelling.  "  Gastrointestinal: Negative for abdominal pain, blood in stool, constipation, diarrhea, nausea and vomiting.   Endocrine: Negative for polyuria.   Genitourinary: Negative for decreased urine volume, dysuria, frequency and urgency.   Musculoskeletal: Negative for arthralgias.   Skin: Negative for rash.   Allergic/Immunologic: Negative for environmental allergies.   Neurological: Negative for seizures, weakness and headaches.   Hematological: Negative for adenopathy.   Psychiatric/Behavioral: Negative for behavioral problems and dysphoric mood.      Physical Examination       Objective     Vitals:    07/09/21 1458   BP: 120/80   Pulse: 91   Resp: 16   SpO2: 98%       Wt Readings from Last 3 Encounters:   07/09/21 62.7 kg (138 lb 3.2 oz)   01/08/21 62.4 kg (137 lb 9.6 oz)   06/08/18 53.6 kg (118 lb 3.2 oz)       Body mass index is 23 kg/m².    Ht Readings from Last 3 Encounters:   07/09/21 1.651 m (5' 5\")   01/08/21 1.651 m (5' 5\")   06/08/18 1.671 m (5' 5.8\")       BP Readings from Last 3 Encounters:   07/09/21 120/80   01/08/21 118/62   06/08/18 106/74       Physical Exam  Vitals and nursing note reviewed.   Constitutional:       General: She is not in acute distress.     Appearance: Normal appearance. She is well-developed. She is not ill-appearing.   HENT:      Head: Normocephalic and atraumatic.      Right Ear: Tympanic membrane and ear canal normal.      Left Ear: Tympanic membrane and ear canal normal.      Nose: Nose normal.      Mouth/Throat:      Mouth: Mucous membranes are moist.   Eyes:      General: Lids are normal.      Conjunctiva/sclera: Conjunctivae normal.      Pupils: Pupils are equal, round, and reactive to light.   Neck:      Thyroid: No thyromegaly.      Trachea: Trachea normal.   Cardiovascular:      Rate and Rhythm: Normal rate and regular rhythm.      Pulses: Normal pulses.           Dorsalis pedis pulses are 2+ on the right side and 2+ on the left side.      Heart sounds: Normal heart sounds. "   Pulmonary:      Effort: Pulmonary effort is normal. No respiratory distress.      Breath sounds: Normal breath sounds. No wheezing.   Abdominal:      General: Bowel sounds are normal.      Palpations: Abdomen is soft.      Tenderness: There is no abdominal tenderness.   Musculoskeletal:         General: Normal range of motion.      Cervical back: Neck supple.      Right lower leg: No edema.      Left lower leg: No edema.   Lymphadenopathy:      Cervical: No cervical adenopathy.   Skin:     General: Skin is warm and dry.      Findings: No rash.   Neurological:      General: No focal deficit present.      Mental Status: She is alert and oriented to person, place, and time.      Motor: No abnormal muscle tone.      Coordination: Coordination normal.      Comments: Normal strength and ROM in extrems   Psychiatric:         Mood and Affect: Mood normal.         Behavior: Behavior normal. Behavior is cooperative.        Laboratory Results     Lab Results   Component Value Date    WBC 3.5 06/15/2018    HGB 11.7 06/15/2018    HCT 36.2 06/15/2018    MCV 94 06/15/2018     06/15/2018          Chemistry        Component Value Date/Time     12/11/2020 1118     04/12/2017 1536    K 4.4 12/11/2020 1118    K 4.2 04/12/2017 1536     12/11/2020 1118     04/12/2017 1536    CO2 26 12/11/2020 1118    CO2 25 04/12/2017 1536    BUN 9 12/11/2020 1118    BUN 9 04/12/2017 1536    CREATININE 0.59 12/11/2020 1118    CREATININE 0.5 (L) 04/12/2017 1536        Component Value Date/Time    CALCIUM 9.2 04/12/2017 1536    ALKPHOS 78 12/11/2020 1118    ALKPHOS 68 04/12/2017 1536    AST 28 12/11/2020 1118    AST 30 04/12/2017 1536    ALT 19 12/11/2020 1118    ALT 25 04/12/2017 1536    BILITOT 0.4 12/11/2020 1118    BILITOT 0.5 04/12/2017 1536            Lab Results   Component Value Date    GLUCOSE 82 12/11/2020    CALCIUM 9.2 04/12/2017     12/11/2020    K 4.4 12/11/2020    CO2 26 12/11/2020     12/11/2020     BUN 9 12/11/2020    CREATININE 0.59 12/11/2020       Lab Results   Component Value Date    CHOL 155 12/11/2020    CHOL 124 06/15/2018    CHOL 118 (L) 03/11/2017     Lab Results   Component Value Date    HDL 67 12/11/2020    HDL 64 06/15/2018    HDL 65 03/11/2017     Lab Results   Component Value Date    LDLCALC 74 12/11/2020    LDLCALC 51 06/15/2018    LDLCALC 42 03/11/2017     Lab Results   Component Value Date    TRIG 69 12/11/2020    TRIG 46 06/15/2018    TRIG 56 03/11/2017     No results found for: CHOLHDL    Lab Results   Component Value Date    TSH 2.540 12/11/2020       No results found for: HGBA1C    Lab Results   Component Value Date    HEPCAB NEGATIVE 05/08/2017         Immunization History   Administered Date(s) Administered   • Influenza Split High Dose Preservative Free IM 09/30/2014   • Influenza Split Preservative Free ID 09/10/2013   • Influenza Vaccine High Dose 65 And Older 10/30/2020   • Influenza Vaccine Quadrivalent 3 Yr And Older 08/01/2016   • Influenza Vaccine Quadrivalent Preservative Free 6-35 Months 09/10/2015   • Influenza, Quadrivalent 11/09/2017   • Influenza, Unspecified 09/10/2015   • Pneumococcal Polysaccharide 01/08/2021   • Sars-cov-2 (Covid-19) Vaccine, Moderna 03/26/2021, 04/30/2021   • Tdap 01/16/2012   • Zoster 12/22/2014   • Zoster Vaccine Recombinant Adjuvanted (Shingrix) 06/08/2018, 01/01/2020, 03/01/2020         Health Maintenance Topics with due status: Overdue       Topic Date Due    Colon Cancer Screening: Annual FIT 05/05/2021     Health Maintenance Topics with due status: Not Due       Topic Last Completion Date    DTaP, Tdap, and Td Vaccines 01/16/2012    DEXA Scan 12/01/2019    Influenza Vaccine 10/30/2020    Mammogram 02/12/2021     Health Maintenance Topics with due status: Completed       Topic Last Completion Date    Hepatitis C Screening 11/14/2017    Zoster Vaccine 03/01/2020    Pneumococcal (65 years and older) 01/08/2021    COVID-19 Vaccine 04/30/2021     Annual Falls Risk Screening 07/09/2021     Health Maintenance Topics with due status: Aged Out       Topic Date Due    Meningococcal ACWY Aged Out    HIB Vaccines Aged Out    IPV Vaccines Aged Out    HPV Vaccines Aged Out    Pneumococcal Aged Out     Health Maintenance Topics with due status: Discontinued       Topic Date Due    Varicella Vaccines Discontinued    Medicare Annual Wellness Visit Discontinued          Assessment and Plan       Assessment/Plan     Problem List Items Addressed This Visit        Musculoskeletal    Age-related osteoporosis without current pathological fracture    Relevant Orders    DEXA BONE DENSITY       Endocrine/Metabolic    Hypothyroidism    Overview     Overview:          Current Assessment & Plan     See History of Present Illness section for assessment and plan on all medical Diagnoses         Relevant Orders    TSH w reflex FT4    Lipid disorder    Relevant Orders    Comprehensive metabolic panel    Lipid panel       Other    Encounter for general adult medical examination without abnormal findings - Primary    Current Assessment & Plan     -Eat diet with regular meals including 2-4 fruit servings , 2-4 vegetable servings, whole grains and lean protien each day  -control portions of carbs and keep down fats and sugars in diet  -exercise for 150 mins per week of cardio or more and 1-2 days per week of something strengthening like yoga, pilates or lifting  -wear sunblock w SPF of 30 or higher  in sun to protect your skin, and reapply often  -avoid all tobacco products  -limit alcohol and caffiene  -aim to get 6-8 hrs of sleep each night  -see your eye doctor every 2-3 yrs  -see your dentist every 6-12 mos  -complete labwork as directed  FU for physical in one year               Relevant Orders    Comprehensive metabolic panel    Lipid panel    Fecal Immunochemical    BI SCREENING MAMMOGRAM BILATERAL(TOMOSYNTHESIS)          Return in about 1 year (around 7/9/2022) for physical,  prn.    Orders Placed This Encounter   Procedures   • DEXA BONE DENSITY     Standing Status:   Future     Standing Expiration Date:   7/9/2022     Order Specific Question:   Does the patient take a Calcium supplement?     Answer:   Yes     Order Specific Question:   Release to patient     Answer:   Immediate   • BI SCREENING MAMMOGRAM BILATERAL(TOMOSYNTHESIS)     Standing Status:   Future     Standing Expiration Date:   9/9/2022     Order Specific Question:   Release to patient     Answer:   Immediate   • Comprehensive metabolic panel     Standing Status:   Future     Number of Occurrences:   1     Standing Expiration Date:   7/9/2022     Order Specific Question:   Release to patient     Answer:   Immediate   • Lipid panel     Standing Status:   Future     Number of Occurrences:   1     Standing Expiration Date:   7/9/2022     Order Specific Question:   Release to patient     Answer:   Immediate   • TSH w reflex FT4     Standing Status:   Future     Number of Occurrences:   1     Standing Expiration Date:   7/9/2022     Order Specific Question:   Release to patient     Answer:   Immediate   • Fecal Immunochemical     Standing Status:   Future     Number of Occurrences:   1     Standing Expiration Date:   7/9/2022     Order Specific Question:   Release to patient     Answer:   Immediate            Inés Peña DO  7/9/2021

## 2021-09-22 RX ORDER — ALENDRONATE SODIUM 70 MG/1
TABLET ORAL
Qty: 12 TABLET | Refills: 1 | Status: SHIPPED | OUTPATIENT
Start: 2021-09-22 | End: 2022-06-07

## 2021-09-22 NOTE — TELEPHONE ENCOUNTER
Medicine Refill Request    Last Office Visit: 7/9/2021  Last Telemedicine Visit: 12/2/2020 Inés Peña DO    Next Office Visit: Visit date not found  Next Telemedicine Visit: Visit date not found         Current Outpatient Medications:   •  alendronate (FOSAMAX) 70 mg tablet, Take 1 tablet (70 mg total) by mouth once a week. Take on an empty stomach and do not lie down for the next 30 min., Disp: 12 tablet, Rfl: 1  •  aspirin 81 mg enteric coated tablet, Take 81 mg by mouth daily., Disp: , Rfl:   •  atorvastatin (LIPITOR) 20 mg tablet, TAKE 1 TABLET BY MOUTH EVERY DAY AT NIGHT, Disp: 90 tablet, Rfl: 1  •  cholecalciferol, vitamin D3, 1,000 unit capsule, Take 2 capsules by mouth daily., Disp: , Rfl:   •  levothyroxine (SYNTHROID) 75 mcg tablet, Take 1 tablet (75 mcg total) by mouth daily. Take on an empty stomach., Disp: 90 tablet, Rfl: 1  •  multivitamin tablet, Take by mouth daily. w Vit C, Disp: , Rfl:       BP Readings from Last 3 Encounters:   07/09/21 120/80   01/08/21 118/62   06/08/18 106/74       Recent Lab results:  Lab Results   Component Value Date    CHOL 155 12/11/2020   ,   Lab Results   Component Value Date    HDL 67 12/11/2020   ,   Lab Results   Component Value Date    LDLCALC 74 12/11/2020   ,   Lab Results   Component Value Date    TRIG 69 12/11/2020        Lab Results   Component Value Date    GLUCOSE 82 12/11/2020   , No results found for: HGBA1C      Lab Results   Component Value Date    CREATININE 0.59 12/11/2020       Lab Results   Component Value Date    TSH 2.540 12/11/2020

## 2021-10-11 RX ORDER — LEVOTHYROXINE SODIUM 75 UG/1
TABLET ORAL
Qty: 90 TABLET | Refills: 1 | Status: SHIPPED | OUTPATIENT
Start: 2021-10-11 | End: 2022-08-03 | Stop reason: SDUPTHER

## 2021-10-11 NOTE — TELEPHONE ENCOUNTER
Medicine Refill Request    Last Office Visit: 7/9/2021  Last Telemedicine Visit: 12/2/2020 Inés Peña DO    Next Office Visit: Visit date not found  Next Telemedicine Visit: Visit date not found         Current Outpatient Medications:   •  alendronate (FOSAMAX) 70 mg tablet, TAKE 1 TABLET BY MOUTH ONCE A WEEK ON AN EMPTY STOMACH. DO NOT LIE DOWN FOR THE NEXT 30 MIN, Disp: 12 tablet, Rfl: 1  •  aspirin 81 mg enteric coated tablet, Take 81 mg by mouth daily., Disp: , Rfl:   •  atorvastatin (LIPITOR) 20 mg tablet, TAKE 1 TABLET BY MOUTH EVERY DAY AT NIGHT, Disp: 90 tablet, Rfl: 1  •  cholecalciferol, vitamin D3, 1,000 unit capsule, Take 2 capsules by mouth daily., Disp: , Rfl:   •  levothyroxine (SYNTHROID) 75 mcg tablet, Take 1 tablet (75 mcg total) by mouth daily. Take on an empty stomach., Disp: 90 tablet, Rfl: 1  •  multivitamin tablet, Take by mouth daily. w Vit C, Disp: , Rfl:       BP Readings from Last 3 Encounters:   07/09/21 120/80   01/08/21 118/62   06/08/18 106/74       Recent Lab results:  Lab Results   Component Value Date    CHOL 155 12/11/2020   ,   Lab Results   Component Value Date    HDL 67 12/11/2020   ,   Lab Results   Component Value Date    LDLCALC 74 12/11/2020   ,   Lab Results   Component Value Date    TRIG 69 12/11/2020        Lab Results   Component Value Date    GLUCOSE 82 12/11/2020   , No results found for: HGBA1C      Lab Results   Component Value Date    CREATININE 0.59 12/11/2020       Lab Results   Component Value Date    TSH 2.540 12/11/2020

## 2021-12-20 RX ORDER — ATORVASTATIN CALCIUM 20 MG/1
TABLET, FILM COATED ORAL
Qty: 90 TABLET | Refills: 1 | Status: SHIPPED | OUTPATIENT
Start: 2021-12-20 | End: 2022-06-17

## 2021-12-20 RX ORDER — ALENDRONATE SODIUM 70 MG/1
TABLET ORAL
Qty: 12 TABLET | Refills: 1 | Status: CANCELLED | OUTPATIENT
Start: 2021-12-20

## 2021-12-20 RX ORDER — ALENDRONATE SODIUM 70 MG/1
70 TABLET ORAL WEEKLY
Qty: 12 TABLET | Refills: 1 | OUTPATIENT
Start: 2021-12-20

## 2021-12-20 NOTE — TELEPHONE ENCOUNTER
Medicine Refill Request    Last Office: 7/9/2021   Last Consult Visit: Visit date not found  Last Telemedicine Visit: 12/2/2020 Inés Peña, DO    Next Appointment: Visit date not found      Current Outpatient Medications:   •  alendronate (FOSAMAX) 70 mg tablet, TAKE 1 TABLET BY MOUTH ONCE A WEEK ON AN EMPTY STOMACH. DO NOT LIE DOWN FOR THE NEXT 30 MIN, Disp: 12 tablet, Rfl: 1  •  aspirin 81 mg enteric coated tablet, Take 81 mg by mouth daily., Disp: , Rfl:   •  atorvastatin (LIPITOR) 20 mg tablet, TAKE 1 TABLET BY MOUTH EVERY DAY AT NIGHT, Disp: 90 tablet, Rfl: 1  •  cholecalciferol, vitamin D3, 1,000 unit capsule, Take 2 capsules by mouth daily., Disp: , Rfl:   •  levothyroxine (SYNTHROID) 75 mcg tablet, TAKE 1 TABLET BY MOUTH DAILY ON AN EMPTY STOMACH, Disp: 90 tablet, Rfl: 1  •  multivitamin tablet, Take by mouth daily. w Vit C, Disp: , Rfl:       BP Readings from Last 3 Encounters:   07/09/21 120/80   01/08/21 118/62   06/08/18 106/74       Recent Lab results:  Lab Results   Component Value Date    CHOL 155 12/11/2020   ,   Lab Results   Component Value Date    HDL 67 12/11/2020   ,   Lab Results   Component Value Date    LDLCALC 74 12/11/2020   ,   Lab Results   Component Value Date    TRIG 69 12/11/2020        Lab Results   Component Value Date    GLUCOSE 82 12/11/2020   , No results found for: HGBA1C      Lab Results   Component Value Date    CREATININE 0.59 12/11/2020       Lab Results   Component Value Date    TSH 2.540 12/11/2020

## 2021-12-21 NOTE — TELEPHONE ENCOUNTER
This medication is too soon to refill; it was last refilled on 9/22/21, for #12 with 1 refill (6 months supply total) and can't refilled again until 3/21/22. Pt was informed via My Chart message and instructed to call the pharmacy for the refill they have available to her.

## 2022-06-07 RX ORDER — ALENDRONATE SODIUM 70 MG/1
TABLET ORAL
Qty: 12 TABLET | Refills: 1 | Status: SHIPPED | OUTPATIENT
Start: 2022-06-07 | End: 2022-11-21

## 2022-06-07 NOTE — TELEPHONE ENCOUNTER
Medicine Refill Request    Last Office: 7/9/2021   Last Consult Visit: Visit date not found  Last Telemedicine Visit: 12/2/2020 Inés Peña DO    Next Appointment: Visit date not found      Current Outpatient Medications:   •  alendronate (FOSAMAX) 70 mg tablet, TAKE 1 TABLET BY MOUTH ONCE A WEEK ON AN EMPTY STOMACH. DO NOT LIE DOWN FOR THE NEXT 30 MIN, Disp: 12 tablet, Rfl: 1  •  aspirin 81 mg enteric coated tablet, Take 81 mg by mouth daily., Disp: , Rfl:   •  atorvastatin 20 mg tablet, TAKE 1 TABLET BY MOUTH EVERY DAY AT NIGHT, Disp: 90 tablet, Rfl: 1  •  cholecalciferol, vitamin D3, 1,000 unit capsule, Take 2 capsules by mouth daily., Disp: , Rfl:   •  levothyroxine (SYNTHROID) 75 mcg tablet, TAKE 1 TABLET BY MOUTH DAILY ON AN EMPTY STOMACH, Disp: 90 tablet, Rfl: 1  •  multivitamin tablet, Take by mouth daily. w Vit C, Disp: , Rfl:       BP Readings from Last 3 Encounters:   07/09/21 120/80   01/08/21 118/62   06/08/18 106/74       Recent Lab results:  Lab Results   Component Value Date    CHOL 155 12/11/2020   ,   Lab Results   Component Value Date    HDL 67 12/11/2020   ,   Lab Results   Component Value Date    LDLCALC 74 12/11/2020   ,   Lab Results   Component Value Date    TRIG 69 12/11/2020        Lab Results   Component Value Date    GLUCOSE 82 12/11/2020   , No results found for: HGBA1C      Lab Results   Component Value Date    CREATININE 0.59 12/11/2020       Lab Results   Component Value Date    TSH 2.540 12/11/2020         Medicine Refill Request    Last Office: 7/9/2021   Last Consult Visit: Visit date not found  Last Telemedicine Visit: 12/2/2020 Inés Peña DO    Next Appointment: Visit date not found      Current Outpatient Medications:   •  alendronate (FOSAMAX) 70 mg tablet, TAKE 1 TABLET BY MOUTH ONCE A WEEK ON AN EMPTY STOMACH. DO NOT LIE DOWN FOR THE NEXT 30 MIN, Disp: 12 tablet, Rfl: 1  •  aspirin 81 mg enteric coated tablet, Take 81 mg by mouth daily., Disp: , Rfl:   •   atorvastatin 20 mg tablet, TAKE 1 TABLET BY MOUTH EVERY DAY AT NIGHT, Disp: 90 tablet, Rfl: 1  •  cholecalciferol, vitamin D3, 1,000 unit capsule, Take 2 capsules by mouth daily., Disp: , Rfl:   •  levothyroxine (SYNTHROID) 75 mcg tablet, TAKE 1 TABLET BY MOUTH DAILY ON AN EMPTY STOMACH, Disp: 90 tablet, Rfl: 1  •  multivitamin tablet, Take by mouth daily. w Vit C, Disp: , Rfl:       BP Readings from Last 3 Encounters:   07/09/21 120/80   01/08/21 118/62   06/08/18 106/74       Recent Lab results:  Lab Results   Component Value Date    CHOL 155 12/11/2020   ,   Lab Results   Component Value Date    HDL 67 12/11/2020   ,   Lab Results   Component Value Date    LDLCALC 74 12/11/2020   ,   Lab Results   Component Value Date    TRIG 69 12/11/2020        Lab Results   Component Value Date    GLUCOSE 82 12/11/2020   , No results found for: HGBA1C      Lab Results   Component Value Date    CREATININE 0.59 12/11/2020       Lab Results   Component Value Date    TSH 2.540 12/11/2020

## 2022-06-22 ENCOUNTER — TELEPHONE (OUTPATIENT)
Dept: SCHEDULING | Facility: CLINIC | Age: 67
End: 2022-06-22
Payer: COMMERCIAL

## 2022-06-22 NOTE — TELEPHONE ENCOUNTER
Patient wanted to be seen asap. She did not care by whom however did request a female. I did schedule her with Dr. Ace, patient was very happy since it was closer to her home.

## 2022-06-22 NOTE — TELEPHONE ENCOUNTER
New Patient Appointment Request    Name of caller: Dominga    Reason for Visit: pt states she had mild heart attack several years ago, and looking for continuous of care    Insurance: [employer]    Insurance ID #: [id number]    Recent Procedures: none    Referred by: Self    Previous Cardiologist name and phone number: pt states several years ago does not remember name    Best contact number: [phone]    Additional notes: CCV

## 2022-06-23 ENCOUNTER — HOSPITAL ENCOUNTER (EMERGENCY)
Facility: HOSPITAL | Age: 67
Discharge: HOME | End: 2022-06-23
Attending: EMERGENCY MEDICINE
Payer: COMMERCIAL

## 2022-06-23 ENCOUNTER — APPOINTMENT (EMERGENCY)
Dept: RADIOLOGY | Facility: HOSPITAL | Age: 67
End: 2022-06-23
Attending: EMERGENCY MEDICINE
Payer: COMMERCIAL

## 2022-06-23 VITALS
TEMPERATURE: 98.3 F | DIASTOLIC BLOOD PRESSURE: 63 MMHG | OXYGEN SATURATION: 99 % | HEART RATE: 68 BPM | RESPIRATION RATE: 20 BRPM | SYSTOLIC BLOOD PRESSURE: 109 MMHG

## 2022-06-23 DIAGNOSIS — R07.89 CHEST WALL PAIN: Primary | ICD-10-CM

## 2022-06-23 LAB
ALBUMIN SERPL-MCNC: 4.4 G/DL (ref 3.4–5)
ALP SERPL-CCNC: 65 IU/L (ref 35–126)
ALT SERPL-CCNC: 19 IU/L (ref 11–54)
ANION GAP SERPL CALC-SCNC: 5 MEQ/L (ref 3–15)
AST SERPL-CCNC: 34 IU/L (ref 15–41)
ATRIAL RATE: 92
BASOPHILS # BLD: 0.04 K/UL (ref 0.01–0.1)
BASOPHILS NFR BLD: 0.7 %
BILIRUB SERPL-MCNC: 0.7 MG/DL (ref 0.3–1.2)
BILIRUB UR QL STRIP.AUTO: NEGATIVE MG/DL
BUN SERPL-MCNC: 9 MG/DL (ref 8–20)
CALCIUM SERPL-MCNC: 9.5 MG/DL (ref 8.9–10.3)
CHLORIDE SERPL-SCNC: 101 MEQ/L (ref 98–109)
CLARITY UR REFRACT.AUTO: CLEAR
CO2 SERPL-SCNC: 28 MEQ/L (ref 22–32)
COLOR UR AUTO: COLORLESS
CREAT SERPL-MCNC: 0.6 MG/DL (ref 0.6–1.1)
D DIMER PPP IA.FEU-MCNC: 0.47 UG/ML FEU (ref 0–0.5)
DIFFERENTIAL METHOD BLD: NORMAL
EOSINOPHIL # BLD: 0.04 K/UL (ref 0.04–0.36)
EOSINOPHIL NFR BLD: 0.7 %
ERYTHROCYTE [DISTWIDTH] IN BLOOD BY AUTOMATED COUNT: 12.4 % (ref 11.7–14.4)
FLUAV RNA SPEC QL NAA+PROBE: NEGATIVE
FLUBV RNA SPEC QL NAA+PROBE: NEGATIVE
GFR SERPL CREATININE-BSD FRML MDRD: >60 ML/MIN/1.73M*2
GLUCOSE SERPL-MCNC: 94 MG/DL (ref 70–99)
GLUCOSE UR STRIP.AUTO-MCNC: NEGATIVE MG/DL
HCT VFR BLDCO AUTO: 39.2 % (ref 35–45)
HGB BLD-MCNC: 12.9 G/DL (ref 11.8–15.7)
HGB UR QL STRIP.AUTO: NEGATIVE
IMM GRANULOCYTES # BLD AUTO: 0.01 K/UL (ref 0–0.08)
IMM GRANULOCYTES NFR BLD AUTO: 0.2 %
KETONES UR STRIP.AUTO-MCNC: NEGATIVE MG/DL
LEUKOCYTE ESTERASE UR QL STRIP.AUTO: NEGATIVE
LYMPHOCYTES # BLD: 1.62 K/UL (ref 1.2–3.5)
LYMPHOCYTES NFR BLD: 29.7 %
MCH RBC QN AUTO: 30.9 PG (ref 28–33.2)
MCHC RBC AUTO-ENTMCNC: 32.9 G/DL (ref 32.2–35.5)
MCV RBC AUTO: 94 FL (ref 83–98)
MONOCYTES # BLD: 0.36 K/UL (ref 0.28–0.8)
MONOCYTES NFR BLD: 6.6 %
NEUTROPHILS # BLD: 3.38 K/UL (ref 1.7–7)
NEUTS SEG NFR BLD: 62.1 %
NITRITE UR QL STRIP.AUTO: NEGATIVE
NRBC BLD-RTO: 0 %
P AXIS: 78
PDW BLD AUTO: 9.6 FL (ref 9.4–12.3)
PH UR STRIP.AUTO: 7 [PH]
PLATELET # BLD AUTO: 251 K/UL (ref 150–369)
POTASSIUM SERPL-SCNC: 4.4 MEQ/L (ref 3.6–5.1)
PR INTERVAL: 156
PROT SERPL-MCNC: 9.5 G/DL (ref 6–8.2)
PROT UR QL STRIP.AUTO: NEGATIVE
QRS DURATION: 96
QT INTERVAL: 376
QTC CALCULATION(BAZETT): 464
R AXIS: 58
RBC # BLD AUTO: 4.17 M/UL (ref 3.93–5.22)
RSV RNA SPEC QL NAA+PROBE: NEGATIVE
SARS-COV-2 RNA RESP QL NAA+PROBE: NEGATIVE
SODIUM SERPL-SCNC: 134 MEQ/L (ref 136–144)
SP GR UR REFRACT.AUTO: 1
T WAVE AXIS: 72
T4 FREE SERPL-MCNC: 0.83 NG/DL (ref 0.58–1.64)
TROPONIN I SERPL HS-MCNC: 2.6 PG/ML
TROPONIN I SERPL HS-MCNC: 2.7 PG/ML
TSH SERPL DL<=0.05 MIU/L-ACNC: 5.51 MIU/L (ref 0.34–5.6)
UROBILINOGEN UR STRIP-ACNC: 0.2 EU/DL
VENTRICULAR RATE: 92
WBC # BLD AUTO: 5.45 K/UL (ref 3.8–10.5)

## 2022-06-23 PROCEDURE — 87637 SARSCOV2&INF A&B&RSV AMP PRB: CPT | Performed by: PHYSICIAN ASSISTANT

## 2022-06-23 PROCEDURE — 36415 COLL VENOUS BLD VENIPUNCTURE: CPT | Performed by: EMERGENCY MEDICINE

## 2022-06-23 PROCEDURE — 99283 EMERGENCY DEPT VISIT LOW MDM: CPT | Mod: 25

## 2022-06-23 PROCEDURE — 84443 ASSAY THYROID STIM HORMONE: CPT | Performed by: PHYSICIAN ASSISTANT

## 2022-06-23 PROCEDURE — 81003 URINALYSIS AUTO W/O SCOPE: CPT | Performed by: PHYSICIAN ASSISTANT

## 2022-06-23 PROCEDURE — 84484 ASSAY OF TROPONIN QUANT: CPT | Performed by: EMERGENCY MEDICINE

## 2022-06-23 PROCEDURE — 63600000 HC DRUGS/DETAIL CODE: Performed by: PHYSICIAN ASSISTANT

## 2022-06-23 PROCEDURE — 84439 ASSAY OF FREE THYROXINE: CPT | Performed by: PHYSICIAN ASSISTANT

## 2022-06-23 PROCEDURE — 96374 THER/PROPH/DIAG INJ IV PUSH: CPT

## 2022-06-23 PROCEDURE — 85379 FIBRIN DEGRADATION QUANT: CPT | Performed by: PHYSICIAN ASSISTANT

## 2022-06-23 PROCEDURE — 3E0333Z INTRODUCTION OF ANTI-INFLAMMATORY INTO PERIPHERAL VEIN, PERCUTANEOUS APPROACH: ICD-10-PCS | Performed by: EMERGENCY MEDICINE

## 2022-06-23 PROCEDURE — 93005 ELECTROCARDIOGRAM TRACING: CPT | Performed by: EMERGENCY MEDICINE

## 2022-06-23 PROCEDURE — 71045 X-RAY EXAM CHEST 1 VIEW: CPT

## 2022-06-23 PROCEDURE — 84484 ASSAY OF TROPONIN QUANT: CPT | Mod: 91 | Performed by: EMERGENCY MEDICINE

## 2022-06-23 PROCEDURE — 80053 COMPREHEN METABOLIC PANEL: CPT | Performed by: EMERGENCY MEDICINE

## 2022-06-23 PROCEDURE — 93005 ELECTROCARDIOGRAM TRACING: CPT

## 2022-06-23 PROCEDURE — 85025 COMPLETE CBC W/AUTO DIFF WBC: CPT | Performed by: EMERGENCY MEDICINE

## 2022-06-23 RX ORDER — KETOROLAC TROMETHAMINE 15 MG/ML
15 INJECTION, SOLUTION INTRAMUSCULAR; INTRAVENOUS ONCE
Status: COMPLETED | OUTPATIENT
Start: 2022-06-23 | End: 2022-06-23

## 2022-06-23 RX ADMIN — KETOROLAC TROMETHAMINE 15 MG: 15 INJECTION, SOLUTION INTRAMUSCULAR; INTRAVENOUS at 12:13

## 2022-06-23 ASSESSMENT — ENCOUNTER SYMPTOMS
ABDOMINAL PAIN: 0
DIFFICULTY URINATING: 0
PALPITATIONS: 0
FEVER: 0
CHILLS: 0
FATIGUE: 1
COUGH: 0
VOMITING: 0
SHORTNESS OF BREATH: 0
BACK PAIN: 0

## 2022-06-23 NOTE — DISCHARGE INSTRUCTIONS
Take ibuprofen as needed. Rest. See cardiology as scheduled. Return to the ER for worsening pain, shortness of breath, leg pain/swelling, dizziness, pass out, or any other concerning symptoms.

## 2022-06-23 NOTE — ED PROVIDER NOTES
"Emergency Medicine Note  HPI   HISTORY OF PRESENT ILLNESS         66 y/o female with PMHx of CAD/MI (2015), HTN, and hypothyroidism presents for evaluation of chest pain x 5 days. Reports 5 days of substernal chest pain which feels like a \"tightness\" and worse with movement and deep breath. Pain minimal with rest/no movement. Also states she feels very fatigued. Seeing new cardiologist in 1 week (last visit 2.5 years ago in [place]). No fevers, chills, cough, SOB, abdominal pain, vomiting, back pain, or leg pain/swelling.            Patient History   PAST HISTORY     Reviewed from Nursing Triage:  Tobacco  Allergies  Meds  Problems  Med Hx  Surg Hx  Fam Hx  Soc   Hx        Past Medical History:   Diagnosis Date   • Allergic rhinitis    • Coronary artery disease    • Hyperproteinemia     w neg heme eval   • Hypertension    • Hypothyroidism    • Lipid disorder        Past Surgical History:   Procedure Laterality Date   • CARDIAC CATHETERIZATION      w 90% occlusion 8/15   • CHOLECYSTECTOMY     • KNEE SURGERY      ORIF patella   • TONSILLECTOMY         Family History   Problem Relation Age of Onset   • Osteoporosis Biological Mother    • Heart disease Biological Father    • Hypertension Biological Father    • Heart disease Biological Brother        Social History     Tobacco Use   • Smoking status: Never Smoker   • Smokeless tobacco: Never Used   Substance Use Topics   • Alcohol use: No   • Drug use: Never         Review of Systems   REVIEW OF SYSTEMS     Review of Systems   Constitutional: Positive for fatigue. Negative for chills and fever.   Respiratory: Negative for cough and shortness of breath.    Cardiovascular: Positive for chest pain. Negative for palpitations and leg swelling.   Gastrointestinal: Negative for abdominal pain and vomiting.   Genitourinary: Negative for difficulty urinating.   Musculoskeletal: Negative for back pain.   Neurological: Negative for syncope.         VITALS     ED Vitals  "   Date/Time Temp Pulse Resp BP SpO2 [facility]   06/23/22 1341 -- 68 20 109/63 99 %    06/23/22 1158 -- 68 23 148/80 98 %    06/23/22 1100 36.8 °C (98.3 °F) 85 18 159/77 100 % MP        Pulse Ox %: 98 % (06/23/22 1217)  Pulse Ox Interpretation: Normal (06/23/22 1217)  Heart Rate: 92 (06/23/22 1217)  Rhythm Strip Interpretation: Normal Sinus Rhythm (06/23/22 1217)     Physical Exam   PHYSICAL EXAM     Physical Exam  Vitals and nursing note reviewed.   Constitutional:       Appearance: Normal appearance.   HENT:      Head: Normocephalic.      Nose: Nose normal.   Eyes:      Conjunctiva/sclera: Conjunctivae normal.   Cardiovascular:      Rate and Rhythm: Normal rate and regular rhythm.   Pulmonary:      Effort: Pulmonary effort is normal.      Breath sounds: Normal breath sounds.      Comments: Mild central chest wall tenderness  Musculoskeletal:      Cervical back: Neck supple.      Comments: No lower leg tenderness or edema   Skin:     General: Skin is warm and dry.   Neurological:      General: No focal deficit present.      Mental Status: She is alert.   Psychiatric:         Mood and Affect: Mood normal.           PROCEDURES     Procedures     DATA     Results     Procedure Component Value Units Date/Time    SARS-CoV-2 (COVID-19), PCR Nasopharynx [[id number]]  (Normal) Collected: 06/23/22 1215    Specimen: Nasopharyngeal Swab from Nasopharynx Updated: 06/23/22 1310    Narrative:      The following orders were created for panel order SARS-CoV-2 (COVID-19), PCR Nasopharynx.  Procedure                               Abnormality         Status                     ---------                               -----------         ------                     SARS-COV-2 (COVID-19)/ F...[[id number]]  Normal              Final result                 Please view results for these tests on the individual orders.    SARS-COV-2 (COVID-19)/ FLU A/B, AND RSV, PCR Nasopharynx [[id number]]  (Normal) Collected: 06/23/22 1215    Specimen:  Nasopharyngeal Swab from Nasopharynx Updated: 06/23/22 1310     SARS-CoV-2 (COVID-19) Negative     Influenza A Negative     Influenza B Negative     Respiratory Syncytial Virus Negative    D-dimer, quantitative [[id number]]  (Normal) Collected: 06/23/22 1158    Specimen: Blood, Venous Updated: 06/23/22 1236     D-Dimer, Quant 0.47 ug/mL FEU      Comment: Suggested Age Related Reference Range: 0.00 - 0.68  The D-Dimer assay can be used as an aid in the diagnosis of DVT or PE. The test can not be used by itself to exclude DVT or PE. When used as a diagnostic aid, the cutoff value is the same as the reference range: <0.5 ug/ml FEU.       TSH w reflex FT4 [[id number]]  (Normal) Collected: 06/23/22 1106    Specimen: Blood, Venous Updated: 06/23/22 1227     TSH 5.51 mIU/L     UA with reflex culture [[id number]]  (Normal) Collected: 06/23/22 1201    Specimen: Urine, Clean Catch Updated: 06/23/22 1210    Narrative:      The following orders were created for panel order UA with reflex culture.  Procedure                               Abnormality         Status                     ---------                               -----------         ------                     UA Reflex to Culture (Ma...[[id number]]  Normal              Final result                 Please view results for these tests on the individual orders.    UA Reflex to Culture (Macroscopic) [[id number]]  (Normal) Collected: 06/23/22 1201    Specimen: Urine, Clean Catch Updated: 06/23/22 1210     Color, Urine Colorless     Clarity, Urine Clear     Specific Gravity, Urine 1.005     pH, Urine 7.0     Leukocyte Esterase Negative     Comment: Results can be falsely negative due to high specific gravity, some antibiotics, glucose >3 g/dl, or WBC other than neutrophils.        Nitrite, Urine Negative     Protein, Urine Negative     Glucose, Urine Negative mg/dL      Ketones, Urine Negative mg/dL      Urobilinogen, Urine 0.2 EU/dL      Bilirubin, Urine Negative mg/dL       Blood, Urine Negative     Comment: The sensitivity of the occult blood test is equivalent to approximately 4 intact RBC/HPF.       HS Troponin I (with 2 hour reflex) [[id number]]  (Normal) Collected: 06/23/22 1106    Specimen: Blood, Venous Updated: 06/23/22 1139     High Sens Troponin I 2.6 pg/mL     Comprehensive metabolic panel [[id number]]  (Abnormal) Collected: 06/23/22 1106    Specimen: Blood, Venous Updated: 06/23/22 1131     Sodium 134 mEQ/L      Potassium 4.4 mEQ/L      Comment: Results obtained on plasma. Plasma Potassium values may be up to 0.4 mEQ/L less than serum values. The differences may be greater for patients with high platelet or white cell counts.  SLIGHT HEMOLYSIS, RESULT MAY BE INCREASED.        Chloride 101 mEQ/L      CO2 28 mEQ/L      BUN 9 mg/dL      Creatinine 0.6 mg/dL      Glucose 94 mg/dL      Calcium 9.5 mg/dL      AST (SGOT) 34 IU/L      Comment: SLIGHT HEMOLYSIS, RESULT MAY BE INCREASED.        ALT (SGPT) 19 IU/L      Comment: SLIGHT HEMOLYSIS, RESULT MAY BE INCREASED.        Alkaline Phosphatase 65 IU/L      Total Protein 9.5 g/dL      Comment: Test performed on plasma which typically contains approximately 0.4 g/dL more protein than serum.        Albumin 4.4 g/dL      Bilirubin, Total 0.7 mg/dL      Comment: SLIGHT HEMOLYSIS, RESULT MAY BE INCREASED.        eGFR >60.0 mL/min/1.73m*2      Anion Gap 5 mEQ/L     CBC and differential [[id number]] Collected: 06/23/22 1106    Specimen: Blood, Venous Updated: 06/23/22 1116     WBC 5.45 K/uL      RBC 4.17 M/uL      Hemoglobin 12.9 g/dL      Hematocrit 39.2 %      MCV 94.0 fL      MCH 30.9 pg      MCHC 32.9 g/dL      RDW 12.4 %      Platelets 251 K/uL      MPV 9.6 fL      Differential Type Auto     nRBC 0.0 %      Immature Granulocytes 0.2 %      Neutrophils 62.1 %      Lymphocytes 29.7 %      Monocytes 6.6 %      Eosinophils 0.7 %      Basophils 0.7 %      Immature Granulocytes, Absolute 0.01 K/uL      Neutrophils, Absolute 3.38 K/uL       Lymphocytes, Absolute 1.62 K/uL      Monocytes, Absolute 0.36 K/uL      Eosinophils, Absolute 0.04 K/uL      Basophils, Absolute 0.04 K/uL           Imaging Results          X-RAY CHEST 1 VIEW (Final result)  Result time 06/23/22 12:05:54    Final result                 Impression:    IMPRESSION: No acute disease.    COMMENT: Portable view of the chest demonstrates a normal heart, pulmonary  vasculature, mediastinum and indio. The lungs are clear bilaterally. Compared  with 8/25/2015.             Narrative:    CLINICAL HISTORY: Chest pain.                                ECG 12 lead   Final Result      ECG 12 lead   ED Interpretation   NSR 92 bpm, normal P, normal QRS, normal S-T          Scoring tools                                 ED Course & MDM   MDM / ED COURSE / CLINICAL IMPRESSIONS / DISPO     MDM    ED Course as of 06/23/22 1415   Thu Jun 23, 2022   1153 Normal VS. Well-appearing. Chest pain reproducible to palpation and movement. Suspect chest wall etiology. Plan for labs, EKG, CXR. R/o PE, cardiac ischemia. [TT]   1414 All workup unremarkable. Resting comfortably. Will f/u with cardiology next week. Understands plan and return precautions. [TT]      ED Course User Index  [TT] Pascale Rader PA C         Clinical Impressions as of 06/23/22 1415   Chest wall pain     Discharge         Pascale Rader PA C  06/23/22 1415

## 2022-06-23 NOTE — ED ATTESTATION NOTE
I have personally seen and examined the patient.  I reviewed and agree with physician assistant / nurse practitioner’s assessment and plan of care, with the following exceptions: None  My examination, assessment, and plan of care of Dominga Dee is as follows:       67-year-old female complaining of 5 days of chest pain.  Pain is pleuritic.  Pain is worse with movement and deep breath.  Nonexertional.  No shortness of breath.  No nausea vomiting fevers chills or cough.    Patient awake alert no acute distress  Lungs are clear no respiratory distress  Heart is regular rate and rhythm    Plan  Check labs EKG chest x-ray pain control    On re-evaluation, Patient feels better.  Well-appearing.  Negative troponin, nonischemic EKG, nonexertional, pleuritic chest pain.  Has a follow-up appointment next week with cardiology.    I was physically present for the key/critical portions of the following procedures: None       Tim Martino MD  06/23/22 [id number]

## 2022-06-24 ENCOUNTER — TELEPHONE (OUTPATIENT)
Dept: PRIMARY CARE | Facility: CLINIC | Age: 67
End: 2022-06-24
Payer: COMMERCIAL

## 2022-06-24 NOTE — TELEPHONE ENCOUNTER
"ED D/C Follow Up Call    Called patient to discuss recent ED visit to [facility] on 6/23/22 s/p chest pain.  Patient states \"she feels OK today.\"  She only experiences discomfort if she moves a certain way or lifts something.  Offered to schedule patient for an ED follow up appointment with Dr. Peña, however, patient declined.  States \"she has an appointment with Dr. Peña coming up soon.\"  Refer to ED if symptoms worsen and/or if any CP/SOB present.  Verbalizes understanding and patient has no questions or concerns at this time.  "

## 2022-06-28 NOTE — PROGRESS NOTES
"     Cardiology  Office Consult Note         Reason for visit: No chief complaint on file.      HPI     Dominga Dee is a 67 y.o. female who presents to the office for cardiovascular evaluation. She was referred by her PCP, Dr. Peña related to recent reports of chest discomfort. She was treated in  ED on 6/23 related to these symptoms. She reported 5 days of substernal chest pain that she described as a \"tightness\". Her symptoms were worse with movement, palpation and deep breathing. She also noted feeling very fatigued. BP elevated during evaluation. ACS ruled out. She was discharged home with plans for outpatient follow up.     She has a PMH significant for CAD with hx of MI 8/2015, HTN, HLD and hypothyroidism previously followed by Dr. Segundo Ledesma. She also has a family hx of premature heart disease. She has been lost to follow up since 2015. She had a negative stress test 9/2017.     [facility] 8/25/15     Findings :   1. 99% lesion in terminal 2 cm of the OM1 with ZENON 2 flow. The vessel was 1.25-1.5 mm in diameter.   2. There was mild anterolateral hypokinesis with LVEF 55%.  3. TR band RRA  No complications occurred during catheterization.   Recommendations :  No intervention was performed due to distal nature of disease, very small vessel size and the fact that she was pain free at the end of procedure.   Medical therapy for ACS with DAPT.   Add statin, low dose beta blocker.    {REVIEWED RECORDS:63110}.    Past Medical History:   Diagnosis Date   • Allergic rhinitis    • Coronary artery disease    • Hyperproteinemia     w neg heme eval   • Hypertension    • Hypothyroidism    • Lipid disorder        Past Surgical History:   Procedure Laterality Date   • CARDIAC CATHETERIZATION      w 90% occlusion 8/15   • CHOLECYSTECTOMY     • KNEE SURGERY      ORIF patella   • TONSILLECTOMY          Social History     Tobacco Use   Smoking Status Never Smoker   Smokeless Tobacco Never Used     Social History "     Tobacco Use   • Smoking status: Never Smoker   • Smokeless tobacco: Never Used   Substance Use Topics   • Alcohol use: No   • Drug use: Never       Family History   Problem Relation Age of Onset   • Osteoporosis Biological Mother    • Heart disease Biological Father    • Hypertension Biological Father    • Heart disease Biological Brother        Allergies:  No known allergies    Current Outpatient Medications   Medication Sig Dispense Refill   • alendronate (FOSAMAX) 70 mg tablet TAKE 1 TABLET BY MOUTH ONCE A WEEK ON AN EMPTY STOMACH. DO NOT LIE DOWN FOR THE NEXT 30 MIN 12 tablet 1   • aspirin 81 mg enteric coated tablet Take 81 mg by mouth daily.     • atorvastatin (LIPITOR) 20 mg tablet TAKE 1 TABLET BY MOUTH EVERY DAY AT NIGHT 90 tablet 0   • cholecalciferol, vitamin D3, 1,000 unit capsule Take 2 capsules by mouth daily.     • levothyroxine (SYNTHROID) 75 mcg tablet TAKE 1 TABLET BY MOUTH DAILY ON AN EMPTY STOMACH 90 tablet 1   • multivitamin tablet Take by mouth daily. w Vit C       No current facility-administered medications for this visit.       ROS    Objective     There were no vitals filed for this visit.    Wt Readings from Last 1 Encounters:   07/09/21 62.7 kg (138 lb 3.2 oz)       There is no height or weight on file to calculate BMI.    Physical Exam    ***  ECG   {Findings; ecg normal:96462}    Hematology  Lab Results   Component Value Date    WBC 5.45 06/23/2022    HGB 12.9 06/23/2022    HCT 39.2 06/23/2022     06/23/2022    INR 0.9 08/25/2015       Chemistries  Lab Results   Component Value Date     (L) 06/23/2022    K 4.4 06/23/2022     06/23/2022    CREATININE 0.6 06/23/2022    BUN 9 06/23/2022    CO2 28 06/23/2022    GLUCOSE 94 06/23/2022    CALCIUM 9.5 06/23/2022    MG 2.0 08/26/2015    ALT 19 06/23/2022    AST 34 06/23/2022       Cholesterol  Lab Results   Component Value Date    CHOL 155 12/11/2020    TRIG 69 12/11/2020    HDL 67 12/11/2020    LDLCALC 74 12/11/2020        Endocrine  Lab Results   Component Value Date    TSH 5.51 06/23/2022    FREET4 0.83 06/23/2022       Assessment/Plan     No problem-specific Assessment & Plan notes found for this encounter.    No orders of the defined types were placed in this encounter.      There are no discontinued medications.    No orders of the defined types were placed in this encounter.      Follow Up Plans:  No follow-ups on file.          {Nurse Scribe Attest:[id number]}    {Physician Scribe Attest:[id number]}    Corwin Cramer RN  6/28/2022

## 2022-06-29 NOTE — PROGRESS NOTES
"     Cardiology  Office Consult Note         Reason for visit: No chief complaint on file.      HPI     Dominga Dee is a 67 y.o. female who presents to the office for cardiovascular evaluation.    She has a PMH significant for CAD with hx of MI 8/2015, HTN, HLD and hypothyroidism. She has a family hx of premature heart disease. She previously followed with cardiologist Dr. Segundo Ledesma. She has been lost to follow up since 2015. She had a negative stress test 9/2017.      She was referred by her PCP, Dr. Peña related to recent reports of chest discomfort. She was treated in  ED on 6/23 related to these symptoms. She reported 5 days of substernal chest pain that she described as a \"tightness\". Her symptoms were worse with movement, palpation and deep breathing. She also noted feeling very fatigued. BP elevated during evaluation. ACS ruled out. She was discharged home with plans for outpatient follow up.      12/11/20 FLP: , TG 69, HDL 67, LDL 74.   6/23/22 TSH 5.51, T4 0.83. Cr 0.6.              Outside records reviewed. Pertinent lab results reviewed..    Past Medical History:   Diagnosis Date   • Allergic rhinitis    • Coronary artery disease    • Hyperproteinemia     w neg heme eval   • Hypertension    • Hypothyroidism    • Lipid disorder        Past Surgical History:   Procedure Laterality Date   • CARDIAC CATHETERIZATION      w 90% occlusion 8/15   • CHOLECYSTECTOMY     • KNEE SURGERY      ORIF patella   • TONSILLECTOMY          Social History     Tobacco Use   Smoking Status Never Smoker   Smokeless Tobacco Never Used     Social History     Tobacco Use   • Smoking status: Never Smoker   • Smokeless tobacco: Never Used   Substance Use Topics   • Alcohol use: No   • Drug use: Never       Family History   Problem Relation Age of Onset   • Osteoporosis Biological Mother    • Heart disease Biological Father    • Hypertension Biological Father    • Heart disease Biological Brother  "       Allergies:  No known allergies    Current Outpatient Medications   Medication Sig Dispense Refill   • alendronate (FOSAMAX) 70 mg tablet TAKE 1 TABLET BY MOUTH ONCE A WEEK ON AN EMPTY STOMACH. DO NOT LIE DOWN FOR THE NEXT 30 MIN 12 tablet 1   • aspirin 81 mg enteric coated tablet Take 81 mg by mouth daily.     • atorvastatin (LIPITOR) 20 mg tablet TAKE 1 TABLET BY MOUTH EVERY DAY AT NIGHT 90 tablet 0   • cholecalciferol, vitamin D3, 1,000 unit capsule Take 2 capsules by mouth daily.     • levothyroxine (SYNTHROID) 75 mcg tablet TAKE 1 TABLET BY MOUTH DAILY ON AN EMPTY STOMACH 90 tablet 1   • multivitamin tablet Take by mouth daily. w Vit C       No current facility-administered medications for this visit.       ROS    Objective     There were no vitals filed for this visit.    Wt Readings from Last 1 Encounters:   07/09/21 62.7 kg (138 lb 3.2 oz)       There is no height or weight on file to calculate BMI.    Physical Exam    ***  ECG   {Findings; ecg normal:84933}    Hematology  Lab Results   Component Value Date    WBC 5.45 06/23/2022    HGB 12.9 06/23/2022    HCT 39.2 06/23/2022     06/23/2022    INR 0.9 08/25/2015       Chemistries  Lab Results   Component Value Date     (L) 06/23/2022    K 4.4 06/23/2022     06/23/2022    CREATININE 0.6 06/23/2022    BUN 9 06/23/2022    CO2 28 06/23/2022    GLUCOSE 94 06/23/2022    CALCIUM 9.5 06/23/2022    MG 2.0 08/26/2015    ALT 19 06/23/2022    AST 34 06/23/2022       Cholesterol  Lab Results   Component Value Date    CHOL 155 12/11/2020    TRIG 69 12/11/2020    HDL 67 12/11/2020    LDLCALC 74 12/11/2020       Endocrine  Lab Results   Component Value Date    TSH 5.51 06/23/2022    FREET4 0.83 06/23/2022     Cardiac Testing    [facility] 8/25/15   Findings :   1. 99% lesion in terminal 2 cm of the OM1 with ZENON 2 flow. The vessel was 1.25-1.5 mm in diameter.   2. There was mild anterolateral hypokinesis with LVEF 55%.  3. TR band RRA  No complications  occurred during catheterization.   Recommendations :  No intervention was performed due to distal nature of disease, very small vessel size and the fact that she was pain free at the end of procedure.   Medical therapy for ACS with DAPT.   Add statin, low dose beta blocker.    Assessment/Plan     No problem-specific Assessment & Plan notes found for this encounter.    No orders of the defined types were placed in this encounter.      There are no discontinued medications.    No orders of the defined types were placed in this encounter.           I, Nikia Hunt, am scribing for, and in the presence of, Justine Ace.    I, Justine Ace, personally performed the services described in this documentation as scribed by Nikia Hunt in my presence, and it is both accurate and complete.     Nikia Hunt RN  6/29/2022

## 2022-06-30 ENCOUNTER — OFFICE VISIT (OUTPATIENT)
Dept: CARDIOLOGY | Facility: CLINIC | Age: 67
End: 2022-06-30
Payer: COMMERCIAL

## 2022-06-30 VITALS
SYSTOLIC BLOOD PRESSURE: 118 MMHG | OXYGEN SATURATION: 99 % | BODY MASS INDEX: 21.83 KG/M2 | HEART RATE: 71 BPM | HEIGHT: 65 IN | WEIGHT: 131 LBS | DIASTOLIC BLOOD PRESSURE: 80 MMHG

## 2022-06-30 DIAGNOSIS — R07.89 CHEST TIGHTNESS: ICD-10-CM

## 2022-06-30 DIAGNOSIS — I25.10 CORONARY ARTERY DISEASE INVOLVING NATIVE CORONARY ARTERY OF NATIVE HEART WITHOUT ANGINA PECTORIS: ICD-10-CM

## 2022-06-30 DIAGNOSIS — E78.9 LIPID DISORDER: ICD-10-CM

## 2022-06-30 DIAGNOSIS — R07.9 CHEST PAIN, UNSPECIFIED TYPE: Primary | ICD-10-CM

## 2022-06-30 PROCEDURE — 93000 ELECTROCARDIOGRAM COMPLETE: CPT | Performed by: INTERNAL MEDICINE

## 2022-06-30 PROCEDURE — 99204 OFFICE O/P NEW MOD 45 MIN: CPT | Performed by: INTERNAL MEDICINE

## 2022-06-30 PROCEDURE — 3008F BODY MASS INDEX DOCD: CPT | Performed by: INTERNAL MEDICINE

## 2022-06-30 ASSESSMENT — ENCOUNTER SYMPTOMS
DYSPNEA ON EXERTION: 0
PALPITATIONS: 0
LIGHT-HEADEDNESS: 0
DIZZINESS: 0

## 2022-06-30 NOTE — PROGRESS NOTES
"     Cardiology  Office Consult Note         Reason for visit:   Chief Complaint   Patient presents with   • Cardiovascular Evaluation       HPI     Dominga Dee is a 67 y.o. female who presents to the office for cardiovascular evaluation.    She has a PMH significant for CAD with hx of MI 8/2015, HTN, HLD and hypothyroidism. She has a family hx of premature heart disease. She previously followed with cardiologist Dr. Segundo Ledesma. She has been lost to follow up since 2015. She had a negative stress test 9/2017.      She was referred by her PCP, Dr. Peña related to recent reports of chest discomfort. She was treated in  ED on 6/23 related to these symptoms. She reported 5 days of substernal chest pain that she described as a \"tightness\". Her symptoms were worse with movement, palpation and deep breathing. She also noted feeling very fatigued. BP was elevated during evaluation. ACS ruled out. She was discharged home with plans for outpatient follow up.      12/11/20 FLP: , TG 69, HDL 67, LDL 74.   6/23/22 TSH 5.51, T4 0.83. Cr 0.6.    She has had any further chest discomfort. When she did have it, lying on her right side was uncomfortable. She went tot he ER because that discomfort was the same feeling as when she had her MI in 2015.     She is active at home and denies having any other cardiac symptoms. She does not exercise regularly.     Pertinent lab results reviewed..    Past Medical History:   Diagnosis Date   • Allergic rhinitis    • Arthritis    • Coronary artery disease    • Hyperlipidemia    • Hyperproteinemia     w neg heme eval   • Hypertension    • Hypothyroidism    • Lipid disorder    • Vertigo        Past Surgical History:   Procedure Laterality Date   • ADENOIDECTOMY     • CARDIAC CATHETERIZATION      w 90% occlusion 8/15   • CHOLECYSTECTOMY     • KNEE SURGERY Right     ORIF patella   • TONSILLECTOMY          Social History     Tobacco Use   Smoking Status Former Smoker   Smokeless " Tobacco Never Used   Tobacco Comment    stopped @ age 29     Social History     Tobacco Use   • Smoking status: Former Smoker   • Smokeless tobacco: Never Used   • Tobacco comment: stopped @ age 29   Substance Use Topics   • Alcohol use: No   • Drug use: Never       Family History   Problem Relation Age of Onset   • Hypertension Biological Mother    • Osteoporosis Biological Mother    • Heart attack Biological Father         1st age 36, mild one after that and COD 39   • Heart disease Biological Father    • Hypertension Biological Father    • Heart disease Biological Brother 44        CABG x4   • No Known Problems Biological Brother    • Drug abuse Biological Brother        Allergies:  No known allergies    Current Outpatient Medications   Medication Sig Dispense Refill   • alendronate (FOSAMAX) 70 mg tablet TAKE 1 TABLET BY MOUTH ONCE A WEEK ON AN EMPTY STOMACH. DO NOT LIE DOWN FOR THE NEXT 30 MIN 12 tablet 1   • aspirin 81 mg enteric coated tablet Take 81 mg by mouth daily.     • atorvastatin (LIPITOR) 20 mg tablet TAKE 1 TABLET BY MOUTH EVERY DAY AT NIGHT 90 tablet 0   • cholecalciferol, vitamin D3, 1,000 unit capsule Take 2 capsules by mouth daily.     • levothyroxine (SYNTHROID) 75 mcg tablet TAKE 1 TABLET BY MOUTH DAILY ON AN EMPTY STOMACH 90 tablet 1   • multivitamin tablet Take by mouth daily. w Vit C       No current facility-administered medications for this visit.       Review of Systems   Cardiovascular: Negative for chest pain, dyspnea on exertion, leg swelling and palpitations.   Neurological: Negative for dizziness and light-headedness.       Objective     Vitals:    06/30/22 1324   BP: 118/80   Pulse: 71   SpO2: 99%       Wt Readings from Last 1 Encounters:   06/30/22 59.4 kg (131 lb)       Body mass index is 21.8 kg/m².    Physical Exam  Constitutional:       Appearance: She is well-developed.   HENT:      Head: Normocephalic and atraumatic.   Neck:      Vascular: No JVD.   Cardiovascular:      Rate  and Rhythm: Normal rate and regular rhythm.      Pulses: Intact distal pulses.      Heart sounds: S1 normal and S2 normal. No murmur heard.  Pulmonary:      Effort: Pulmonary effort is normal.      Breath sounds: Normal breath sounds.   Abdominal:      General: Bowel sounds are normal.      Palpations: Abdomen is soft.   Skin:     General: Skin is warm.   Neurological:      Mental Status: She is alert.     ECG   Sinus  Rhythm 69 bpm  WITHIN NORMAL LIMITS    EKG 6/23/22  Normal sinus normal ECG    Hematology  Lab Results   Component Value Date    WBC 5.45 06/23/2022    HGB 12.9 06/23/2022    HCT 39.2 06/23/2022     06/23/2022    INR 0.9 08/25/2015       Chemistries  Lab Results   Component Value Date     (L) 06/23/2022    K 4.4 06/23/2022     06/23/2022    CREATININE 0.6 06/23/2022    BUN 9 06/23/2022    CO2 28 06/23/2022    GLUCOSE 94 06/23/2022    CALCIUM 9.5 06/23/2022    MG 2.0 08/26/2015    ALT 19 06/23/2022    AST 34 06/23/2022       Cholesterol  Lab Results   Component Value Date    CHOL 155 12/11/2020    TRIG 69 12/11/2020    HDL 67 12/11/2020    LDLCALC 74 12/11/2020       Endocrine  Lab Results   Component Value Date    TSH 5.51 06/23/2022    FREET4 0.83 06/23/2022     Cardiac Testing     [facility] 8/25/15 [facility]  Findings :   1. 99% lesion in terminal 2 cm of the OM1 with ZENON 2 flow. The vessel was 1.25-1.5 mm in diameter.   2. There was mild anterolateral hypokinesis with LVEF 55%.  3. TR band RRA  No complications occurred during catheterization.   Recommendations :  No intervention was performed due to distal nature of disease, very small vessel size and the fact that she was pain free at the end of procedure.   Medical therapy for ACS with DAPT.   Add statin, low dose beta blocker.    Echo 5/16/17:   1. Concenteric LVH with normal LV systolic wall function.   2. Grade I DD.   3. Aortic sclerosis.   4. Mild MR.   5. Mild TR.   6. Unchanged study compared to 10/8/15.      Assessment/Plan     CAD (coronary artery disease)  Patient has history of CAD with hx of MI 8/2015- 99% occlusion of small OM1, treated medically. Last stress test negative 9/2017.   Continue aggressive risk factor modification.  She is on ASA 81 mg daily.  Blood pressure well controlled today off medications.  Recheck lipid panel.  See chest pain below.    Lipid disorder  12/11/20 FLP: , TG 69, HDL 67, LDL 74 on Lipitor 20.  Check repeat FLP. Goal LDL is <<70. I will call her with lipid results and increase Lipitor if needed.    Chest tightness  Chest tightness has now resolved. Atypical, pleuritic in nature and worse with laying on that side, suspect MSK.  EKG is normal today and at presentation in the ED.   She has a history of 99% occlusion of small OM1 and concerning family history (brother and father had history of early CAD in 30-40s).  I have advised her to start a graded exercise regimen. If she has any concerning symptoms, will check stress EKG.  Will defer stress testing for now.    No orders of the defined types were placed in this encounter.      There are no discontinued medications.    Orders Placed This Encounter   Procedures   • Lipid panel     Standing Status:   Future     Number of Occurrences:   1     Standing Expiration Date:   6/30/2023     Order Specific Question:   Release to patient     Answer:   Immediate   • ECG 12 LEAD-OFFICE PERFORMED     Scheduling Instructions:      PLEASE USE THIS ORDER FOR ECG'S PERFORMED IN PHYSICIAN OFFICES     Order Specific Question:   Release to patient     Answer:   Immediate       Follow Up Plans:  Return in about 6 months (around 12/30/2022).          I, Jennifer Ramos , am scribing for, and in the presence of, Justine Ace.    I, Justine Ace, personally performed the services described in this documentation as scribed by Jennifer Ramos  in my presence, and it is both accurate and complete.     Justine Ace MD  6/30/2022

## 2022-06-30 NOTE — ASSESSMENT & PLAN NOTE
Chest tightness has now resolved. Atypical, pleuritic in nature and worse with laying on that side, suspect MSK.  EKG is normal today and at presentation in the ED.   She has a history of 99% occlusion of small OM1 and concerning family history (brother and father had history of early CAD in 30-40s).  I have advised her to start a graded exercise regimen. If she has any concerning symptoms, will check stress EKG.  Will defer stress testing for now.

## 2022-06-30 NOTE — ASSESSMENT & PLAN NOTE
12/11/20 FLP: , TG 69, HDL 67, LDL 74 on Lipitor 20.  Check repeat FLP. Goal LDL is <<70. I will call her with lipid results and increase Lipitor if needed.

## 2022-06-30 NOTE — PROGRESS NOTES
Cardiology  Office Consult Note         Reason for visit:   Chief Complaint   Patient presents with   • Cardiovascular Evaluation       HPI     Dominga Dee is a 67 y.o. female who presents to the office for cardiovascular evaluation.    {REVIEWED RECORDS:74635}.         Past Medical History:   Diagnosis Date   • Allergic rhinitis    • Coronary artery disease    • Hyperproteinemia     w neg heme eval   • Hypertension    • Hypothyroidism    • Lipid disorder        Past Surgical History:   Procedure Laterality Date   • CARDIAC CATHETERIZATION      w 90% occlusion 8/15   • CHOLECYSTECTOMY     • KNEE SURGERY      ORIF patella   • TONSILLECTOMY          Social History     Tobacco Use   Smoking Status Never Smoker   Smokeless Tobacco Never Used     Social History     Tobacco Use   • Smoking status: Never Smoker   • Smokeless tobacco: Never Used   Substance Use Topics   • Alcohol use: No   • Drug use: Never       Family History   Problem Relation Age of Onset   • Osteoporosis Biological Mother    • Heart disease Biological Father    • Hypertension Biological Father    • Heart disease Biological Brother        Allergies:  No known allergies    Current Outpatient Medications   Medication Sig Dispense Refill   • alendronate (FOSAMAX) 70 mg tablet TAKE 1 TABLET BY MOUTH ONCE A WEEK ON AN EMPTY STOMACH. DO NOT LIE DOWN FOR THE NEXT 30 MIN 12 tablet 1   • aspirin 81 mg enteric coated tablet Take 81 mg by mouth daily.     • atorvastatin (LIPITOR) 20 mg tablet TAKE 1 TABLET BY MOUTH EVERY DAY AT NIGHT 90 tablet 0   • cholecalciferol, vitamin D3, 1,000 unit capsule Take 2 capsules by mouth daily.     • levothyroxine (SYNTHROID) 75 mcg tablet TAKE 1 TABLET BY MOUTH DAILY ON AN EMPTY STOMACH 90 tablet 1   • multivitamin tablet Take by mouth daily. w Vit C       No current facility-administered medications for this visit.       ROS    Objective     There were no vitals filed for this visit.    Wt Readings from Last 1  Encounters:   07/09/21 62.7 kg (138 lb 3.2 oz)       There is no height or weight on file to calculate BMI.    Physical Exam    ***  ECG   {Findings; ecg normal:56156}    Hematology  Lab Results   Component Value Date    WBC 5.45 06/23/2022    HGB 12.9 06/23/2022    HCT 39.2 06/23/2022     06/23/2022    INR 0.9 08/25/2015       Chemistries  Lab Results   Component Value Date     (L) 06/23/2022    K 4.4 06/23/2022     06/23/2022    CREATININE 0.6 06/23/2022    BUN 9 06/23/2022    CO2 28 06/23/2022    GLUCOSE 94 06/23/2022    CALCIUM 9.5 06/23/2022    MG 2.0 08/26/2015    ALT 19 06/23/2022    AST 34 06/23/2022       Cholesterol  Lab Results   Component Value Date    CHOL 155 12/11/2020    TRIG 69 12/11/2020    HDL 67 12/11/2020    LDLCALC 74 12/11/2020       Endocrine  Lab Results   Component Value Date    TSH 5.51 06/23/2022    FREET4 0.83 06/23/2022            Assessment/Plan     No problem-specific Assessment & Plan notes found for this encounter.    No orders of the defined types were placed in this encounter.      There are no discontinued medications.    No orders of the defined types were placed in this encounter.      Follow Up Plans:  No follow-ups on file.          Jennifer BLACKMON, am scribing for, and in the presence of, Karan Patton Jr., MD.     Karan BLACKMON Jr., MD, personally performed the services described in this documentation as scribed by Jennifer Ramos in my presence, and it is both accurate and complete.         Karan Patton Jr., MD  6/30/2022

## 2022-06-30 NOTE — ASSESSMENT & PLAN NOTE
Patient has history of CAD with hx of MI 8/2015- 99% occlusion of small OM1, treated medically. Last stress test negative 9/2017.   Continue aggressive risk factor modification.  She is on ASA 81 mg daily.  Blood pressure well controlled today off medications.  Recheck lipid panel.  See chest pain below.

## 2022-07-09 LAB
CHOLEST SERPL-MCNC: 140 MG/DL (ref 100–199)
HDLC SERPL-MCNC: 62 MG/DL
LDLC SERPL CALC-MCNC: 62 MG/DL (ref 0–99)
TRIGL SERPL-MCNC: 87 MG/DL (ref 0–149)
VLDLC SERPL CALC-MCNC: 16 MG/DL (ref 5–40)

## 2022-08-03 RX ORDER — LEVOTHYROXINE SODIUM 75 UG/1
75 TABLET ORAL
Qty: 90 TABLET | Refills: 1 | Status: SHIPPED | OUTPATIENT
Start: 2022-08-03 | End: 2023-01-26

## 2022-08-03 NOTE — TELEPHONE ENCOUNTER
Medicine Refill Request    Last Office: 7/9/2021   Last Consult Visit: Visit date not found  Last Telemedicine Visit: 12/2/2020 Inés Peña,     Next Appointment: 11/11/2022      Current Outpatient Medications:   •  alendronate (FOSAMAX) 70 mg tablet, TAKE 1 TABLET BY MOUTH ONCE A WEEK ON AN EMPTY STOMACH. DO NOT LIE DOWN FOR THE NEXT 30 MIN, Disp: 12 tablet, Rfl: 1  •  aspirin 81 mg enteric coated tablet, Take 81 mg by mouth daily., Disp: , Rfl:   •  atorvastatin (LIPITOR) 20 mg tablet, TAKE 1 TABLET BY MOUTH EVERY DAY AT NIGHT, Disp: 90 tablet, Rfl: 0  •  cholecalciferol, vitamin D3, 1,000 unit capsule, Take 2 capsules by mouth daily., Disp: , Rfl:   •  levothyroxine (SYNTHROID) 75 mcg tablet, TAKE 1 TABLET BY MOUTH DAILY ON AN EMPTY STOMACH, Disp: 90 tablet, Rfl: 1  •  multivitamin tablet, Take by mouth daily. w Vit C, Disp: , Rfl:       BP Readings from Last 3 Encounters:   06/30/22 118/80   06/23/22 109/63   07/09/21 120/80       Recent Lab results:  Lab Results   Component Value Date    CHOL 140 07/08/2022   ,   Lab Results   Component Value Date    HDL 62 07/08/2022   ,   Lab Results   Component Value Date    LDLCALC 62 07/08/2022   ,   Lab Results   Component Value Date    TRIG 87 07/08/2022        Lab Results   Component Value Date    GLUCOSE 94 06/23/2022   , No results found for: HGBA1C      Lab Results   Component Value Date    CREATININE 0.6 06/23/2022       Lab Results   Component Value Date    TSH 5.51 06/23/2022

## 2022-09-07 RX ORDER — ATORVASTATIN CALCIUM 20 MG/1
TABLET, FILM COATED ORAL
Qty: 90 TABLET | Refills: 0 | Status: SHIPPED | OUTPATIENT
Start: 2022-09-07 | End: 2022-11-23 | Stop reason: SDUPTHER

## 2022-09-07 NOTE — TELEPHONE ENCOUNTER
Medicine Refill Request    Last Office: 7/9/2021   Last Consult Visit: Visit date not found  Last Telemedicine Visit: 12/2/2020 Inés Peña,     Next Appointment: 11/11/2022      Current Outpatient Medications:     alendronate (FOSAMAX) 70 mg tablet, TAKE 1 TABLET BY MOUTH ONCE A WEEK ON AN EMPTY STOMACH. DO NOT LIE DOWN FOR THE NEXT 30 MIN, Disp: 12 tablet, Rfl: 1    aspirin 81 mg enteric coated tablet, Take 81 mg by mouth daily., Disp: , Rfl:     atorvastatin (LIPITOR) 20 mg tablet, TAKE 1 TABLET BY MOUTH EVERY DAY AT NIGHT, Disp: 90 tablet, Rfl: 0    cholecalciferol, vitamin D3, 1,000 unit capsule, Take 2 capsules by mouth daily., Disp: , Rfl:     levothyroxine (SYNTHROID) 75 mcg tablet, Take 1 tablet (75 mcg total) by mouth daily. , before breakfast, on an empty stomach., Disp: 90 tablet, Rfl: 1    multivitamin tablet, Take by mouth daily. w Vit C, Disp: , Rfl:       BP Readings from Last 3 Encounters:   06/30/22 118/80   06/23/22 109/63   07/09/21 120/80       Recent Lab results:  Lab Results   Component Value Date    CHOL 140 07/08/2022   ,   Lab Results   Component Value Date    HDL 62 07/08/2022   ,   Lab Results   Component Value Date    LDLCALC 62 07/08/2022   ,   Lab Results   Component Value Date    TRIG 87 07/08/2022        Lab Results   Component Value Date    GLUCOSE 94 06/23/2022   , No results found for: HGBA1C      Lab Results   Component Value Date    CREATININE 0.6 06/23/2022       Lab Results   Component Value Date    TSH 5.51 06/23/2022

## 2022-10-11 ENCOUNTER — TELEPHONE (OUTPATIENT)
Dept: PRIMARY CARE | Facility: CLINIC | Age: 67
End: 2022-10-11
Payer: COMMERCIAL

## 2022-10-11 NOTE — TELEPHONE ENCOUNTER
Pt called to r/s her Yearly Physical with Dr. Peña which is scheduled for 11/11/2022.  First avail is in April and pt would like to see someone else besides Dr. Peña sooner.  I advised that all doctors schedules were very booked but she asked me to send message because she says it is too long to wait.    Please advise.

## 2022-10-17 NOTE — TELEPHONE ENCOUNTER
I left a message that we were putting her on the schedule for 11:20 AM on 11-23 and if that was not good to please call me back.

## 2022-11-11 ENCOUNTER — TELEPHONE (OUTPATIENT)
Dept: PRIMARY CARE | Facility: CLINIC | Age: 67
End: 2022-11-11

## 2022-11-11 DIAGNOSIS — Z12.31 ENCOUNTER FOR SCREENING MAMMOGRAM FOR MALIGNANT NEOPLASM OF BREAST: Primary | ICD-10-CM

## 2022-11-11 DIAGNOSIS — M81.0 AGE-RELATED OSTEOPOROSIS WITHOUT CURRENT PATHOLOGICAL FRACTURE: ICD-10-CM

## 2022-11-11 NOTE — TELEPHONE ENCOUNTER
----- Message from Dominga Dee sent at 11/10/2022  9:25 AM EST -----  Regarding: Annual mammogram and bone density test  Contact: [phone]  Good morning.   I tried to make appointments for these test but they said would you put a prescription in the   System before I make the appointment.   Or if Dr. Peña wanted to wait until my annual visit on 11/23/22    Thank you   Dominga

## 2022-11-21 RX ORDER — ALENDRONATE SODIUM 70 MG/1
TABLET ORAL
Qty: 12 TABLET | Refills: 1 | Status: SHIPPED | OUTPATIENT
Start: 2022-11-21 | End: 2023-05-08

## 2022-11-21 NOTE — TELEPHONE ENCOUNTER
Medicine Refill Request    Last Office: 7/9/2021   Last Consult Visit: Visit date not found  Last Telemedicine Visit: 12/2/2020 Inés Peña,     Next Appointment: 11/23/2022      Current Outpatient Medications:     alendronate (FOSAMAX) 70 mg tablet, TAKE 1 TABLET BY MOUTH ONCE A WEEK ON AN EMPTY STOMACH. DO NOT LIE DOWN FOR THE NEXT 30 MIN, Disp: 12 tablet, Rfl: 1    aspirin 81 mg enteric coated tablet, Take 81 mg by mouth daily., Disp: , Rfl:     atorvastatin (LIPITOR) 20 mg tablet, TAKE 1 TABLET BY MOUTH EVERY DAY AT NIGHT, Disp: 90 tablet, Rfl: 0    cholecalciferol, vitamin D3, 1,000 unit capsule, Take 2 capsules by mouth daily., Disp: , Rfl:     levothyroxine (SYNTHROID) 75 mcg tablet, Take 1 tablet (75 mcg total) by mouth daily. , before breakfast, on an empty stomach., Disp: 90 tablet, Rfl: 1    multivitamin tablet, Take by mouth daily. w Vit C, Disp: , Rfl:       BP Readings from Last 3 Encounters:   06/30/22 118/80   06/23/22 109/63   07/09/21 120/80       Recent Lab results:  Lab Results   Component Value Date    CHOL 140 07/08/2022   ,   Lab Results   Component Value Date    HDL 62 07/08/2022   ,   Lab Results   Component Value Date    LDLCALC 62 07/08/2022   ,   Lab Results   Component Value Date    TRIG 87 07/08/2022        Lab Results   Component Value Date    GLUCOSE 94 06/23/2022   , No results found for: HGBA1C      Lab Results   Component Value Date    CREATININE 0.6 06/23/2022       Lab Results   Component Value Date    TSH 5.51 06/23/2022

## 2022-11-23 ENCOUNTER — OFFICE VISIT (OUTPATIENT)
Dept: PRIMARY CARE | Facility: CLINIC | Age: 67
End: 2022-11-23
Payer: COMMERCIAL

## 2022-11-23 VITALS
DIASTOLIC BLOOD PRESSURE: 78 MMHG | RESPIRATION RATE: 16 BRPM | BODY MASS INDEX: 22.82 KG/M2 | HEIGHT: 65 IN | SYSTOLIC BLOOD PRESSURE: 126 MMHG | WEIGHT: 137 LBS

## 2022-11-23 DIAGNOSIS — M81.0 AGE-RELATED OSTEOPOROSIS WITHOUT CURRENT PATHOLOGICAL FRACTURE: ICD-10-CM

## 2022-11-23 DIAGNOSIS — Z00.00 ENCOUNTER FOR GENERAL ADULT MEDICAL EXAMINATION WITHOUT ABNORMAL FINDINGS: Primary | ICD-10-CM

## 2022-11-23 DIAGNOSIS — I25.10 CORONARY ARTERY DISEASE INVOLVING NATIVE CORONARY ARTERY OF NATIVE HEART WITHOUT ANGINA PECTORIS: ICD-10-CM

## 2022-11-23 DIAGNOSIS — E03.9 ACQUIRED HYPOTHYROIDISM: ICD-10-CM

## 2022-11-23 DIAGNOSIS — E78.9 LIPID DISORDER: ICD-10-CM

## 2022-11-23 PROBLEM — R07.89 CHEST TIGHTNESS: Status: RESOLVED | Noted: 2022-06-30 | Resolved: 2022-11-23

## 2022-11-23 PROCEDURE — 99397 PER PM REEVAL EST PAT 65+ YR: CPT | Mod: 25 | Performed by: FAMILY MEDICINE

## 2022-11-23 PROCEDURE — 99213 OFFICE O/P EST LOW 20 MIN: CPT | Mod: 25 | Performed by: FAMILY MEDICINE

## 2022-11-23 PROCEDURE — 3008F BODY MASS INDEX DOCD: CPT | Performed by: FAMILY MEDICINE

## 2022-11-23 PROCEDURE — 90472 IMMUNIZATION ADMIN EACH ADD: CPT | Performed by: FAMILY MEDICINE

## 2022-11-23 PROCEDURE — 90670 PCV13 VACCINE IM: CPT | Performed by: FAMILY MEDICINE

## 2022-11-23 PROCEDURE — 90471 IMMUNIZATION ADMIN: CPT | Performed by: FAMILY MEDICINE

## 2022-11-23 PROCEDURE — 90694 VACC AIIV4 NO PRSRV 0.5ML IM: CPT | Performed by: FAMILY MEDICINE

## 2022-11-23 RX ORDER — ATORVASTATIN CALCIUM 20 MG/1
20 TABLET, FILM COATED ORAL SEE ADMIN INSTRUCTIONS
Qty: 90 TABLET | Refills: 2 | Status: SHIPPED | OUTPATIENT
Start: 2022-11-23 | End: 2023-08-17

## 2022-11-23 ASSESSMENT — ENCOUNTER SYMPTOMS
WHEEZING: 0
DYSPHORIC MOOD: 0
ADENOPATHY: 0
FATIGUE: 0
DYSURIA: 0
DIARRHEA: 0
RHINORRHEA: 0
COUGH: 0
FEVER: 0
HEADACHES: 0
FREQUENCY: 0
WEAKNESS: 0
ABDOMINAL PAIN: 0
SORE THROAT: 0
APPETITE CHANGE: 0
SEIZURES: 0
HEMATURIA: 0
VOMITING: 0
NAUSEA: 0
BLOOD IN STOOL: 0
SHORTNESS OF BREATH: 0
UNEXPECTED WEIGHT CHANGE: 0
ARTHRALGIAS: 0
EYE DISCHARGE: 0
CONSTIPATION: 0

## 2022-11-23 ASSESSMENT — PATIENT HEALTH QUESTIONNAIRE - PHQ9
SUM OF ALL RESPONSES TO PHQ QUESTIONS 1-9: 3
SUM OF ALL RESPONSES TO PHQ9 QUESTIONS 1 & 2: 2

## 2022-11-23 NOTE — PROGRESS NOTES
Inés Peña DO    [facility]  [address]  [place] [address]  Phone: [phone]  Fax: [phone]     History of Present Illness   Subjective     Patient ID: Dominga Dee is a 67 y.o. female.    Dominga is here  for Physical and FU thyroid, Chol, BP, CAD, Osteoporosis -   Last seen by me on 7/21  BP is normal  off med   Home cuff readings - call if going over 140/90   BMI 22  Wt similar/down 1 lb from our last OV  Diet -  3 meals and good foods  Exercise -walks just restarted 30 mins 5+ dys/wk  Eye dr exam w glasses UTD  Dentist due so set up  Hypothyroid on 75 mcg Levothyroxine qd and BW 6/22 was low normal so no med change and rechk TSH now  Chol on Lipitor 20 qd  - BW to goal 6/22 so no med change    CAD / MI 8/15, stress tst neg 9/17 - last seen by cardio 6/22 w ER visit for CP but was felt to be ok and doing cardio FU 1/2023 w no further symptoms- CP feels was a strain and resolved then  Hx of low  Vit D-  on 2000 IU and BW ok 3/17  Immunoglobin G elevated and so was referred  to heme/onc and eval neg per 7/17 note and says had FU 2018 and no FU needed/prn  Osteoporosis on DEXA  on Fosamax since ~12/19 so FU scan ordered to set up    HM-  -flu shot 11/22 today  -Tdap 1/12 so due but may be best covered by her insurance at pharmacy w cut so confirm  -[id number] ~2018 so 11/22 today  -[id number] 1/21   -Shingrix 1/2020, 3/2020  -Zostavax 2014  -COVID vaccines 3/21, 4/21 so booster discussed to consider settting up  -PAP/gyn 5/15 w neg PAP/HPV and done 4/19 that was normal and told no further PAPs needed so chk on FU  -mammo 2/21 so ordered 11/22 already so set up  -scope not done and declines so FIT neg 5/2020 so discussed and reordered FIT to do now she still prefers  -DEXA 12/19 + and txn as above so ordered 11/22 already and reminded  -BW 6/22, 7/22 reviewed and FU TSH ordered  -Hep C screen neg w heme in past  -EKG 9/13  -MWV 1/2020 w last     -Physical covered w insurance now so done today 11/22  -Falls screen 11/22 today  -PHQ screen 11/22 today         Past Medical/Surgical/Family/Social History     The following have been reviewed and updated as appropriate in this visit:   Allergies  Meds  Problems         Past Medical History:   Diagnosis Date    Allergic rhinitis     Arthritis     Coronary artery disease     Hyperlipidemia     Hyperproteinemia     w neg heme eval    Hypertension     Hypothyroidism     Lipid disorder     Vertigo        Past Surgical History:   Procedure Laterality Date    ADENOIDECTOMY      CARDIAC CATHETERIZATION      w 90% occlusion 8/15    CHOLECYSTECTOMY      KNEE SURGERY Right     ORIF patella    TONSILLECTOMY         Family History   Problem Relation Age of Onset    Hypertension Biological Mother     Osteoporosis Biological Mother     Heart attack Biological Father         1st age 36, mild one after that and COD 39    Heart disease Biological Father     Hypertension Biological Father     Heart disease Biological Brother 44        CABG x4    No Known Problems Biological Brother     Drug abuse Biological Brother        Social History     Tobacco Use    Smoking status: Former    Smokeless tobacco: Never    Tobacco comments:     stopped @ age 29   Substance Use Topics    Alcohol use: No    Drug use: Never      Allergies and Medications     Allergies   Allergen Reactions    No Known Allergies          Outpatient Encounter Medications as of 11/23/2022:     atorvastatin (LIPITOR) 20 mg tablet, Take 1 tablet (20 mg total) by mouth See admin instr. At Night    multivitamin tablet, Take by mouth daily. w Vit C and echinacea    alendronate (FOSAMAX) 70 mg tablet, TAKE 1 TABLET BY MOUTH ONCE A WEEK ON AN EMPTY STOMACH. DO NOT LIE DOWN FOR THE NEXT 30 MIN    aspirin 81 mg enteric coated tablet, Take 81 mg by mouth daily.    cholecalciferol, vitamin D3, 1,000 unit capsule, Take 2 capsules by mouth  "daily.    levothyroxine (SYNTHROID) 75 mcg tablet, Take 1 tablet (75 mcg total) by mouth daily. , before breakfast, on an empty stomach.    [DISCONTINUED] alendronate (FOSAMAX) 70 mg tablet, TAKE 1 TABLET BY MOUTH ONCE A WEEK ON AN EMPTY STOMACH. DO NOT LIE DOWN FOR THE NEXT 30 MIN    [DISCONTINUED] atorvastatin (LIPITOR) 20 mg tablet, TAKE 1 TABLET BY MOUTH EVERY DAY AT NIGHT    [DISCONTINUED] multivitamin tablet, Take by mouth daily. w Vit C   Review of Systems       Review of Systems   Constitutional: Negative for appetite change, fatigue, fever and unexpected weight change.   HENT: Negative for congestion, ear pain, hearing loss, rhinorrhea and sore throat.    Eyes: Negative for discharge and visual disturbance.   Respiratory: Negative for cough, shortness of breath and wheezing.    Cardiovascular: Negative for chest pain and leg swelling.   Gastrointestinal: Negative for abdominal pain, blood in stool, constipation, diarrhea, nausea and vomiting.   Endocrine: Negative for polyuria.   Genitourinary: Negative for decreased urine volume, dysuria, frequency, hematuria, urgency and vaginal bleeding.   Musculoskeletal: Negative for arthralgias.   Skin: Negative for rash.   Allergic/Immunologic: Negative for environmental allergies.   Neurological: Negative for seizures, weakness and headaches.   Hematological: Negative for adenopathy.   Psychiatric/Behavioral: Negative for behavioral problems and dysphoric mood.        A little down since mom passed 1/22 but generally coping  Sleep occat off so can use prn Melatonin      Physical Examination       Objective     Vitals:    11/23/22 1114   BP: 126/78   Resp: 16       Wt Readings from Last 3 Encounters:   11/23/22 62.1 kg (137 lb)   06/30/22 59.4 kg (131 lb)   07/09/21 62.7 kg (138 lb 3.2 oz)       Body mass index is 22.8 kg/m².    Ht Readings from Last 3 Encounters:   11/23/22 1.651 m (5' 5\")   06/30/22 1.651 m (5' 5\")   07/09/21 1.651 m (5' 5\")       BP Readings " from Last 3 Encounters:   11/23/22 126/78   06/30/22 118/80   06/23/22 109/63       Physical Exam  Vitals and nursing note reviewed.   Constitutional:       General: She is not in acute distress.     Appearance: She is well-developed and well-nourished. She is not ill-appearing.   HENT:      Head: Normocephalic and atraumatic.      Right Ear: Tympanic membrane and ear canal normal.      Left Ear: Tympanic membrane and ear canal normal.      Nose: Nose normal.      Mouth/Throat:      Mouth: Oropharynx is clear and moist.      Pharynx: Oropharynx is clear.   Eyes:      General: Lids are normal.      Extraocular Movements: EOM normal.      Conjunctiva/sclera: Conjunctivae normal.      Pupils: Pupils are equal, round, and reactive to light.   Neck:      Thyroid: No thyromegaly.      Vascular: No carotid bruit.      Trachea: Trachea normal.   Cardiovascular:      Rate and Rhythm: Normal rate and regular rhythm.      Pulses: Intact distal pulses.           Dorsalis pedis pulses are 2+ on the right side and 2+ on the left side.      Heart sounds: Normal heart sounds.   Pulmonary:      Effort: Pulmonary effort is normal. No respiratory distress.      Breath sounds: Normal breath sounds. No wheezing.   Abdominal:      General: Bowel sounds are normal.      Palpations: Abdomen is soft.      Tenderness: There is no abdominal tenderness.   Musculoskeletal:         General: No edema. Normal range of motion.      Cervical back: Neck supple.      Right lower leg: No edema.      Left lower leg: No edema.   Lymphadenopathy:      Cervical: No cervical adenopathy.   Skin:     General: Skin is warm, dry and intact.      Findings: No rash.   Neurological:      General: No focal deficit present.      Mental Status: She is alert and oriented to person, place, and time.      Motor: No abnormal muscle tone.      Coordination: Coordination normal.      Comments: Normal strength and ROM in extrems   Psychiatric:         Mood and Affect: Mood  and affect and mood normal.         Behavior: Behavior normal. Behavior is cooperative.         Cognition and Memory: Cognition and memory normal.        Laboratory Results     Lab Results   Component Value Date    WBC 5.45 06/23/2022    HGB 12.9 06/23/2022    HCT 39.2 06/23/2022    MCV 94.0 06/23/2022     06/23/2022          Chemistry        Component Value Date/Time     (L) 06/23/2022 1106    K 4.4 06/23/2022 1106     06/23/2022 1106    CO2 28 06/23/2022 1106    BUN 9 06/23/2022 1106    CREATININE 0.6 06/23/2022 1106        Component Value Date/Time    CALCIUM 9.5 06/23/2022 1106    ALKPHOS 65 06/23/2022 1106    AST 34 06/23/2022 1106    ALT 19 06/23/2022 1106    BILITOT 0.7 06/23/2022 1106            Lab Results   Component Value Date    GLUCOSE 94 06/23/2022    CALCIUM 9.5 06/23/2022     (L) 06/23/2022    K 4.4 06/23/2022    CO2 28 06/23/2022     06/23/2022    BUN 9 06/23/2022    CREATININE 0.6 06/23/2022       Lab Results   Component Value Date    CHOL 140 07/08/2022    CHOL 155 12/11/2020    CHOL 124 06/15/2018     Lab Results   Component Value Date    HDL 62 07/08/2022    HDL 67 12/11/2020    HDL 64 06/15/2018     Lab Results   Component Value Date    LDLCALC 62 07/08/2022    LDLCALC 74 12/11/2020    LDLCALC 51 06/15/2018     Lab Results   Component Value Date    TRIG 87 07/08/2022    TRIG 69 12/11/2020    TRIG 46 06/15/2018     No results found for: CHOLHDL    Lab Results   Component Value Date    TSH 5.51 06/23/2022       No results found for: HGBA1C    Lab Results   Component Value Date    HEPCAB NEGATIVE 05/08/2017         Immunization History   Administered Date(s) Administered    INFLUENZA VACCINE QUAD ADJUVANTED 65 and OLDER 11/23/2022    Influenza Split High Dose Preservative Free IM 09/30/2014    Influenza Split Preservative Free ID 09/10/2013    Influenza Vaccine High Dose 65 And Older 10/30/2020    Influenza Vaccine Quadrivalent 3 Yr And Older 08/01/2016     Influenza Vaccine Quadrivalent Preservative Free 6-35 Months 09/10/2015    Influenza, Live, Intranasal, Quadrivalent 11/09/2017    Influenza, Unspecified 09/10/2015    Pneumococcal Conjugate 13-Valent 11/23/2022    Pneumococcal Polysaccharide 01/08/2021    SARS-COV-2 (COVID-19) VACCINE, MODERNA 03/26/2021, 04/30/2021    Tdap 01/16/2012    Zoster 12/22/2014    Zoster Vaccine Recombinant Adjuvanted (Shingrix) 06/08/2018, 01/01/2020, 03/01/2020         Health Maintenance Topics with due status: Overdue       Topic Date Due    Colorectal Cancer Screening 05/05/2021    DEXA Scan 12/01/2021     Health Maintenance Topics with due status: Postponed       Topic Postponed Until    COVID-19 Vaccine 11/23/2023 (Originally 6/25/2021)    DTaP, Tdap, and Td Vaccines 11/29/2023 (Originally 1/16/2022)     Health Maintenance Topics with due status: Not Due       Topic Last Completion Date    Breast Cancer Screening 02/12/2021    Depression Screening 11/23/2022     Health Maintenance Topics with due status: Completed       Topic Last Completion Date    Hepatitis C Screening 11/14/2017    Zoster Vaccine 03/01/2020    Annual Falls Risk Screening 11/23/2022    Influenza Vaccine 11/23/2022    Pneumococcal (65 years and older) 11/23/2022     Health Maintenance Topics with due status: Aged Out       Topic Date Due    Meningococcal ACWY Aged Out    HIB Vaccines Aged Out    IPV Vaccines Aged Out    HPV Vaccines Aged Out     Health Maintenance Topics with due status: Discontinued       Topic Date Due    Medicare Annual Wellness Visit Discontinued          Assessment and Plan       Assessment/Plan     Problem List Items Addressed This Visit        Circulatory    CAD (coronary artery disease)    Overview     Overview: stress echo neg 9/17         Relevant Medications    atorvastatin (LIPITOR) 20 mg tablet       Musculoskeletal    Age-related osteoporosis without current pathological fracture       Endocrine/Metabolic    Hypothyroidism     Overview     Overview:          Current Assessment & Plan     See History of Present Illness section for assessment and plan on all medical Diagnoses         Relevant Orders    TSH w reflex FT4       Other    Lipid disorder    Encounter for general adult medical examination without abnormal findings - Primary    Current Assessment & Plan     -Eat diet with regular meals including 2-4 fruit servings , 2-4 vegetable servings, whole grains and lean protien each day  -control portions of carbs and keep down fats and sugars in diet  -exercise for 150 mins per week of cardio or more and 1-2 days per week of something strengthening like yoga, pilates or lifting  -wear sunblock w SPF of 30 or higher  in sun to protect your skin, and reapply often  -avoid all tobacco products  -limit alcohol and caffiene  -aim to get 6-8 hrs of sleep each night  -see your eye doctor every 2-3 yrs  -see your dentist every 6-12 mos  -complete labwork as directed  FU for physical in one year               Relevant Orders    Fecal Immunochemical       Return in about 1 year (around 11/23/2023) for physical, medication follow up.    Orders Placed This Encounter   Procedures    Influenza vaccine Quad Adjuvanted 65 and Older (FluAd Quad)    Pneumococcal conjugate vaccine 13-valent IM    Fecal Immunochemical     Standing Status:   Future     Number of Occurrences:   1     Standing Expiration Date:   11/23/2023     Order Specific Question:   Release to patient     Answer:   Immediate    TSH w reflex FT4     Standing Status:   Future     Number of Occurrences:   1     Standing Expiration Date:   11/23/2023     Order Specific Question:   Release to patient     Answer:   Immediate            Inés Peña,   11/23/2022

## 2022-11-25 ENCOUNTER — HOSPITAL ENCOUNTER (OUTPATIENT)
Dept: RADIOLOGY | Age: 67
Discharge: HOME | End: 2022-11-25
Attending: FAMILY MEDICINE
Payer: COMMERCIAL

## 2022-11-25 DIAGNOSIS — M81.0 AGE-RELATED OSTEOPOROSIS WITHOUT CURRENT PATHOLOGICAL FRACTURE: ICD-10-CM

## 2022-11-25 DIAGNOSIS — Z12.31 ENCOUNTER FOR SCREENING MAMMOGRAM FOR MALIGNANT NEOPLASM OF BREAST: ICD-10-CM

## 2022-11-25 PROCEDURE — 77063 BREAST TOMOSYNTHESIS BI: CPT

## 2022-11-25 PROCEDURE — 77080 DXA BONE DENSITY AXIAL: CPT

## 2022-12-07 ENCOUNTER — HOSPITAL ENCOUNTER (OUTPATIENT)
Facility: CLINIC | Age: 67
Discharge: HOME | End: 2022-12-07
Attending: FAMILY MEDICINE
Payer: COMMERCIAL

## 2022-12-07 VITALS
TEMPERATURE: 98.7 F | RESPIRATION RATE: 18 BRPM | OXYGEN SATURATION: 98 % | DIASTOLIC BLOOD PRESSURE: 73 MMHG | SYSTOLIC BLOOD PRESSURE: 141 MMHG | HEART RATE: 78 BPM

## 2022-12-07 DIAGNOSIS — R30.0 DYSURIA: Primary | ICD-10-CM

## 2022-12-07 LAB
BACTERIA, POC: 0
BILIRUBIN, POC: NEGATIVE
BLOOD URINE, POC: ABNORMAL
CLARITY, POC: ABNORMAL
COLOR, POC: YELLOW
EXPIRATION DATE: ABNORMAL
GLUCOSE URINE, POC: NEGATIVE
KETONES, POC: POSITIVE
LEUKOCYTE EST, POC: ABNORMAL
Lab: ABNORMAL
NITRITE, POC: NEGATIVE
PH, POC: 6
POCT MANUFACTURER: ABNORMAL
PROTEIN, POC: NEGATIVE
SPECIFIC GRAVITY, POC: 1.01
UROBILINOGEN, POC: 0.2

## 2022-12-07 PROCEDURE — 99202 OFFICE O/P NEW SF 15 MIN: CPT | Performed by: FAMILY MEDICINE

## 2022-12-07 PROCEDURE — 81002 URINALYSIS NONAUTO W/O SCOPE: CPT | Performed by: FAMILY MEDICINE

## 2022-12-07 PROCEDURE — S9083 URGENT CARE CENTER GLOBAL: HCPCS | Performed by: FAMILY MEDICINE

## 2022-12-07 RX ORDER — CEFUROXIME AXETIL 250 MG/1
250 TABLET ORAL 2 TIMES DAILY
Qty: 14 TABLET | Refills: 0 | Status: SHIPPED | OUTPATIENT
Start: 2022-12-07 | End: 2022-12-12 | Stop reason: CLARIF

## 2022-12-07 ASSESSMENT — ENCOUNTER SYMPTOMS
NAUSEA: 0
BACK PAIN: 0
FEVER: 0
FREQUENCY: 1
CHILLS: 0
HEMATURIA: 0
ABDOMINAL PAIN: 0
DIFFICULTY URINATING: 0
DYSURIA: 1
VOMITING: 0
FATIGUE: 0
FLANK PAIN: 0

## 2022-12-07 NOTE — DISCHARGE INSTRUCTIONS
You have been prescribed an antibiotic,   Once started, please complete full course of antibiotic to prevent creating resistance within the bugs causing your illness.       Please eat a daily yogurt or take a daily Probiotic for GI health while taking antibiotics.  Recommending the probiotic Digestive Advantage (safe for patients 3yrs and older).    Remember to take the probiotic *no sooner* than 4 hours *after* taking your antibiotic.  If you take the probiotic sooner than that, the good bacteria will likely be killed by the antibiotic.     Azo is a medication to help relieve your uncomfortable symptoms.  **It will turn your urine ORANGE** Fear not! This is expected ;)      We will contact you with your lab results once they return.       Please let us know about your experience today!   Your input is truly appreciated and helps Dr. Wright and your team at [facility] to provide a superior level of service!     Get Well Soon!

## 2022-12-07 NOTE — ED PROVIDER NOTES
History  Chief Complaint   Patient presents with   • UTI     Pressure, urgency, x 1 week     Dominga is a 68 yo female presenting with urinary symptoms for  1 week   Urgency, frequency, similar to previous UTIs        History provided by:  Patient   used: No    UTI  Associated symptoms: no abdominal pain, no fatigue, no fever, no nausea and no vomiting        Past Medical History:   Diagnosis Date   • Allergic rhinitis    • Arthritis    • Coronary artery disease    • Hyperlipidemia    • Hyperproteinemia     w neg heme eval   • Hypertension    • Hypothyroidism    • Lipid disorder    • Vertigo        Past Surgical History:   Procedure Laterality Date   • ADENOIDECTOMY     • CARDIAC CATHETERIZATION      w 90% occlusion 8/15   • CHOLECYSTECTOMY     • KNEE SURGERY Right     ORIF patella   • TONSILLECTOMY         Family History   Problem Relation Age of Onset   • Hypertension Biological Mother    • Osteoporosis Biological Mother    • Heart attack Biological Father         1st age 36, mild one after that and COD 39   • Heart disease Biological Father    • Hypertension Biological Father    • Heart disease Biological Brother 44        CABG x4   • No Known Problems Biological Brother    • Drug abuse Biological Brother        Social History     Tobacco Use   • Smoking status: Former   • Smokeless tobacco: Never   • Tobacco comments:     stopped @ age 29   Substance Use Topics   • Alcohol use: No   • Drug use: Never       Review of Systems   Constitutional: Negative for chills, fatigue and fever.   Gastrointestinal: Negative for abdominal pain, nausea and vomiting.   Genitourinary: Positive for dysuria, frequency and urgency. Negative for decreased urine volume, difficulty urinating, flank pain and hematuria.   Musculoskeletal: Negative for back pain.       Physical Exam  ED Triage Vitals [12/07/22 0908]   Temp Heart Rate Resp BP SpO2   37.1 °C (98.7 °F) 78 18 (!) 141/73 98 %      Temp src Heart Rate Source  Patient Position BP Location FiO2 (%) (Set)   -- -- -- -- --       Physical Exam  Vitals and nursing note reviewed.   Constitutional:       General: She is not in acute distress.     Appearance: She is well-developed.   HENT:      Head: Normocephalic and atraumatic.   Eyes:      Extraocular Movements: Extraocular movements intact.   Abdominal:      Tenderness: There is no abdominal tenderness. There is no right CVA tenderness, left CVA tenderness, guarding or rebound.   Musculoskeletal:         General: Normal range of motion.   Skin:     General: Skin is warm and dry.   Neurological:      General: No focal deficit present.      Mental Status: She is alert and oriented to person, place, and time.           Procedures  Procedures    UC Course       MDM  Number of Diagnoses or Management Options  Dysuria  Diagnosis management comments: UTI symptoms x 1 week  UA concerning for UTI.   Urine C&S sent.   Ceftin Rx'ed bid x 7 days.   Recommending a daily yogurt or Probiotic for GI health while taking antibiotics.  To take the Probiotic *no sooner* than 4 hours *after* taking the antibiotic to prevent the good bacteria from being killed by the antibiotic.   Urged to seek immediate medical treatment should symptoms worsen.         Amount and/or Complexity of Data Reviewed  Clinical lab tests: ordered and reviewed  Tests in the medicine section of CPT®: ordered    Risk of Complications, Morbidity, and/or Mortality  Presenting problems: low  Diagnostic procedures: moderate  Management options: moderate    Critical Care  Total time providing critical care: < 30 minutes    Patient Progress  Patient progress: stable                 Miko Wright DO  12/07/22 0943

## 2022-12-12 LAB
BACTERIA UR CULT: ABNORMAL
BACTERIA UR CULT: ABNORMAL
OTHER ANTIBIOTIC SUSC ISLT: ABNORMAL

## 2022-12-12 RX ORDER — NITROFURANTOIN 25; 75 MG/1; MG/1
100 CAPSULE ORAL 2 TIMES DAILY
Qty: 14 CAPSULE | Refills: 0 | Status: SHIPPED | OUTPATIENT
Start: 2022-12-12 | End: 2022-12-19

## 2023-01-26 RX ORDER — LEVOTHYROXINE SODIUM 75 UG/1
75 TABLET ORAL
Qty: 90 TABLET | Refills: 1 | Status: SHIPPED | OUTPATIENT
Start: 2023-01-26 | End: 2023-07-21

## 2023-01-26 NOTE — TELEPHONE ENCOUNTER
Medicine Refill Request    Last Office: 11/23/2022   Last Consult Visit: Visit date not found  Last Telemedicine Visit: 12/2/2020 Inés Peña, DO    Next Appointment: Visit date not found      Current Outpatient Medications:   •  alendronate (FOSAMAX) 70 mg tablet, TAKE 1 TABLET BY MOUTH ONCE A WEEK ON AN EMPTY STOMACH. DO NOT LIE DOWN FOR THE NEXT 30 MIN, Disp: 12 tablet, Rfl: 1  •  aspirin 81 mg enteric coated tablet, Take 81 mg by mouth daily., Disp: , Rfl:   •  atorvastatin (LIPITOR) 20 mg tablet, Take 1 tablet (20 mg total) by mouth See admin instr. At Night, Disp: 90 tablet, Rfl: 2  •  cholecalciferol, vitamin D3, 1,000 unit capsule, Take 2 capsules by mouth daily., Disp: , Rfl:   •  levothyroxine (SYNTHROID) 75 mcg tablet, Take 1 tablet (75 mcg total) by mouth daily. , before breakfast, on an empty stomach., Disp: 90 tablet, Rfl: 1  •  multivitamin tablet, Take by mouth daily. w Vit C and echinacea, Disp: , Rfl:       BP Readings from Last 3 Encounters:   12/07/22 (!) 141/73   11/23/22 126/78   06/30/22 118/80       Recent Lab results:  Lab Results   Component Value Date    CHOL 140 07/08/2022   ,   Lab Results   Component Value Date    HDL 62 07/08/2022   ,   Lab Results   Component Value Date    LDLCALC 62 07/08/2022   ,   Lab Results   Component Value Date    TRIG 87 07/08/2022        Lab Results   Component Value Date    GLUCOSE 94 06/23/2022   , No results found for: HGBA1C      Lab Results   Component Value Date    CREATININE 0.6 06/23/2022       Lab Results   Component Value Date    TSH 5.51 06/23/2022

## 2023-02-02 NOTE — PROGRESS NOTES
Just to treat     Cardiology  Office Progress Note         Reason for visit:   Chief Complaint   Patient presents with   • Follow-up       HPI     Dominga Dee is a 67 y.o. female who presents to the office for cardiovascular follow up and management of CAD (99% occlusion of small OM1), HLD, and chest tightness.     She was last seen in the office on 6/30/22 to establish care. She was recently seen in the ER for chest tightness that had since resolved. The chest discomfort was atypical, pleuritic in nature and worse with laying on that side, suspected MSK. EKG was normal in the office and in the ER. I advised her to start a graded exercise program. I ordered a repeat FLP for goal LDL <70. F/u in 6 months.     12/11/20 FLP: , TG 69, HDL 67, LDL 74 on Lipitor 20 mg.  7/8/22 FLP: , TG 87, HDL 62, LDL 62 on Lipitor 20 mg. LDL at goal.    Today she reports feeling great. She has had no further chest discomfort. She has been walking on her treadmill daily without exertional symptoms. She has been tolerating her medications without any adverse reactions. She denies any chest pain, shortness of breath, palpitations, lightheadedness, dizziness or syncopal events.       Past Medical History:   Diagnosis Date   • Allergic rhinitis    • Arthritis    • Coronary artery disease    • Hyperlipidemia    • Hyperproteinemia     w neg heme eval   • Hypertension    • Hypothyroidism    • Lipid disorder    • Vertigo        Past Surgical History:   Procedure Laterality Date   • ADENOIDECTOMY     • CARDIAC CATHETERIZATION      w 90% occlusion 8/15   • CHOLECYSTECTOMY     • KNEE SURGERY Right     ORIF patella   • TONSILLECTOMY         Social History     Tobacco Use   • Smoking status: Former   • Smokeless tobacco: Never   • Tobacco comments:     stopped @ age 29   Substance Use Topics   • Alcohol use: No   • Drug use: Never       Family History   Problem Relation Age of Onset   • Hypertension Biological Mother    •  Osteoporosis Biological Mother    • Heart attack Biological Father         1st age 36, mild one after that and COD 39   • Heart disease Biological Father    • Hypertension Biological Father    • Heart disease Biological Brother 44        CABG x4   • No Known Problems Biological Brother    • Drug abuse Biological Brother        Allergies:  No known allergies    Current Outpatient Medications   Medication Sig Dispense Refill   • alendronate (FOSAMAX) 70 mg tablet TAKE 1 TABLET BY MOUTH ONCE A WEEK ON AN EMPTY STOMACH. DO NOT LIE DOWN FOR THE NEXT 30 MIN 12 tablet 1   • aspirin 81 mg enteric coated tablet Take 81 mg by mouth daily.     • atorvastatin (LIPITOR) 20 mg tablet Take 1 tablet (20 mg total) by mouth See admin instr. At Night 90 tablet 2   • cholecalciferol, vitamin D3, 1,000 unit capsule Take 2 capsules by mouth daily.     • levothyroxine (SYNTHROID) 75 mcg tablet TAKE 1 TABLET (75 MCG TOTAL) BY MOUTH DAILY. , BEFORE BREAKFAST, ON AN EMPTY STOMACH. 90 tablet 1   • multivitamin tablet Take by mouth daily. w Vit C and echinacea       No current facility-administered medications for this visit.       Review of Systems   Constitutional: Negative for diaphoresis.   HENT: Negative for hearing loss.    Eyes: Negative for visual disturbance.   Cardiovascular: Negative for chest pain, dyspnea on exertion, irregular heartbeat, leg swelling, near-syncope, orthopnea, palpitations, paroxysmal nocturnal dyspnea and syncope.   Respiratory: Negative for shortness of breath.    Hematologic/Lymphatic: Does not bruise/bleed easily.   Skin: Negative for rash.   Musculoskeletal: Negative for myalgias.   Gastrointestinal: Negative for hematemesis, hematochezia and melena.   Genitourinary: Negative for hematuria.   Neurological: Negative for dizziness and light-headedness.   Psychiatric/Behavioral: Negative for altered mental status.       Objective     Vitals:    02/03/23 1044   BP: 128/78   Pulse: 67   SpO2: 99%       Wt Readings  from Last 3 Encounters:   02/03/23 63 kg (139 lb)   11/23/22 62.1 kg (137 lb)   06/30/22 59.4 kg (131 lb)       Body mass index is 23.13 kg/m².    Physical Exam  Constitutional:       Appearance: She is well-developed.   HENT:      Head: Normocephalic and atraumatic.   Neck:      Vascular: No JVD.   Cardiovascular:      Rate and Rhythm: Normal rate and regular rhythm.      Pulses: Intact distal pulses.      Heart sounds: S1 normal and S2 normal. No murmur heard.  Pulmonary:      Effort: Pulmonary effort is normal.      Breath sounds: Normal breath sounds.   Abdominal:      General: Bowel sounds are normal.      Palpations: Abdomen is soft.   Skin:     General: Skin is warm.   Neurological:      Mental Status: She is alert.       ECG   Sinus  Rhythm 66 bpm  WITHIN NORMAL LIMITS      Hematology  Lab Results   Component Value Date    WBC 5.45 06/23/2022    HGB 12.9 06/23/2022    HCT 39.2 06/23/2022     06/23/2022    INR 0.9 08/25/2015       Chemistries  Lab Results   Component Value Date     (L) 06/23/2022    K 4.4 06/23/2022     06/23/2022    CREATININE 0.6 06/23/2022    BUN 9 06/23/2022    CO2 28 06/23/2022    GLUCOSE 94 06/23/2022    CALCIUM 9.5 06/23/2022    MG 2.0 08/26/2015    ALT 19 06/23/2022    AST 34 06/23/2022       Cholesterol  Lab Results   Component Value Date    CHOL 140 07/08/2022    TRIG 87 07/08/2022    HDL 62 07/08/2022    LDLCALC 62 07/08/2022       Endocrine  Lab Results   Component Value Date    TSH 5.51 06/23/2022    FREET4 0.83 06/23/2022       Cardiac Imaging    Echo 5/16/17:   1. Concenteric LVH with normal LV systolic wall function.   2. Grade I DD.   3. Aortic sclerosis.   4. Mild MR.   5. Mild TR.   6. Unchanged study compared to 10/8/15.     [facility] 8/25/15 [facility]  Findings :   1. 99% lesion in terminal 2 cm of the OM1 with ZENON 2 flow. The vessel was 1.25-1.5 mm in diameter.   2. There was mild anterolateral hypokinesis with LVEF 55%.  3. TR band RRA  No  complications occurred during catheterization.   Recommendations :  No intervention was performed due to distal nature of disease, very small vessel size and the fact that she was pain free at the end of procedure.   Medical therapy for ACS with DAPT.   Add statin, low dose beta blocker.           Assessment/Plan     CAD (coronary artery disease)  Patient has history of CAD with hx of MI 8/2015- 99% occlusion of small OM1, treated medically. Last stress test negative 9/2017.   Continue aggressive risk factor modification.  She is on ASA 81 mg daily.  Blood pressure well controlled today off medications.  She was having atypical chest tightness which seemed MSK by description, it has now resolved.   She is walking on treadmill daily and feeling well with no recurrent chest pain.   Will defer stress testing for now    Lipid disorder   12/11/20 FLP: , TG 69, HDL 67, LDL 74 on Lipitor 20 mg.  7/8/22 FLP: , TG 87, HDL 62, LDL 62 on Lipitor 20 mg. LDL at goal.      No orders of the defined types were placed in this encounter.      There are no discontinued medications.    Orders Placed This Encounter   Procedures   • ECG 12 LEAD-OFFICE PERFORMED     Scheduling Instructions:      PLEASE USE THIS ORDER FOR ECG'S PERFORMED IN PHYSICIAN OFFICES     Order Specific Question:   Release to patient     Answer:   Immediate   • Transthoracic echo (TTE) complete     Standing Status:   Future     Standing Expiration Date:   2/3/2025     Order Specific Question:   Is Definity and/or agitated saline (bubbles) indicated for the study?     Answer:   No     Order Specific Question:   Release to patient     Answer:   Immediate       Follow Up Plans:  Return in about 6 months (around 8/3/2023).              INikia, am scribing for, and in the presence of, Justine Ace.    I, Justine Ace, personally performed the services described in this documentation as scribed by Nikia Hunt in my presence, and  it is both accurate and complete.       Justine Ace MD  2/3/2023

## 2023-02-03 ENCOUNTER — OFFICE VISIT (OUTPATIENT)
Dept: CARDIOLOGY | Facility: CLINIC | Age: 68
End: 2023-02-03
Payer: COMMERCIAL

## 2023-02-03 VITALS
DIASTOLIC BLOOD PRESSURE: 78 MMHG | SYSTOLIC BLOOD PRESSURE: 128 MMHG | WEIGHT: 139 LBS | HEART RATE: 67 BPM | BODY MASS INDEX: 23.16 KG/M2 | HEIGHT: 65 IN | OXYGEN SATURATION: 99 %

## 2023-02-03 DIAGNOSIS — R07.89 CHEST TIGHTNESS: ICD-10-CM

## 2023-02-03 DIAGNOSIS — I25.10 CORONARY ARTERY DISEASE INVOLVING NATIVE CORONARY ARTERY OF NATIVE HEART WITHOUT ANGINA PECTORIS: ICD-10-CM

## 2023-02-03 DIAGNOSIS — E78.9 LIPID DISORDER: Primary | ICD-10-CM

## 2023-02-03 DIAGNOSIS — R07.9 CHEST PAIN, UNSPECIFIED TYPE: ICD-10-CM

## 2023-02-03 PROCEDURE — 3008F BODY MASS INDEX DOCD: CPT | Performed by: INTERNAL MEDICINE

## 2023-02-03 PROCEDURE — 93000 ELECTROCARDIOGRAM COMPLETE: CPT | Performed by: INTERNAL MEDICINE

## 2023-02-03 PROCEDURE — 99214 OFFICE O/P EST MOD 30 MIN: CPT | Performed by: INTERNAL MEDICINE

## 2023-02-03 ASSESSMENT — ENCOUNTER SYMPTOMS
IRREGULAR HEARTBEAT: 0
DIZZINESS: 0
NEAR-SYNCOPE: 0
HEMATEMESIS: 0
ALTERED MENTAL STATUS: 0
LIGHT-HEADEDNESS: 0
BRUISES/BLEEDS EASILY: 0
SHORTNESS OF BREATH: 0
DYSPNEA ON EXERTION: 0
SYNCOPE: 0
PALPITATIONS: 0
MYALGIAS: 0
ORTHOPNEA: 0
HEMATURIA: 0
PND: 0
HEMATOCHEZIA: 0
DIAPHORESIS: 0

## 2023-02-03 NOTE — LETTER
February 3, 2023     Inés Peña DO  [address]  [facility] [address]    Patient: Dominga Dee  YOB: 1955  Date of Visit: 2/3/2023      Dear Dr. Peña:    Thank you for referring Dominga Dee to me for evaluation. Below are my notes for this consultation.    If you have questions, please do not hesitate to call me. I look forward to following your patient along with you.         Sincerely,        Justine Ace MD        CC: Justine Jin MD  2/3/2023  1:42 PM  Signed  Just [facility] treat     Cardiology  Office Progress Note         Reason for visit:   Chief Complaint   Patient presents with   • Follow-up       HPI      Dominga Dee is a 67 y.o. female who presents to the office for cardiovascular follow up and management of CAD (99% occlusion of small OM1), HLD, and chest tightness.     She was last seen in the office on 6/30/22 to establish care. She was recently seen in the ER for chest tightness that had since resolved. The chest discomfort was atypical, pleuritic in nature and worse with laying on that side, suspected MSK. EKG was normal in the office and in the ER. I advised her to start a graded exercise program. I ordered a repeat FLP for goal LDL <70. F/u in 6 months.     12/11/20 FLP: , TG 69, HDL 67, LDL 74 on Lipitor 20 mg.  7/8/22 FLP: , TG 87, HDL 62, LDL 62 on Lipitor 20 mg. LDL at goal.    Today she reports feeling great. She has had no further chest discomfort. She has been walking on her treadmill daily without exertional symptoms. She has been tolerating her medications without any adverse reactions. She denies any chest pain, shortness of breath, palpitations, lightheadedness, dizziness or syncopal events.       Past Medical History:   Diagnosis Date   • Allergic rhinitis    • Arthritis    • Coronary artery disease    • Hyperlipidemia    • Hyperproteinemia     w neg heme eval   • Hypertension     • Hypothyroidism    • Lipid disorder    • Vertigo        Past Surgical History:   Procedure Laterality Date   • ADENOIDECTOMY     • CARDIAC CATHETERIZATION      w 90% occlusion 8/15   • CHOLECYSTECTOMY     • KNEE SURGERY Right     ORIF patella   • TONSILLECTOMY         Social History     Tobacco Use   • Smoking status: Former   • Smokeless tobacco: Never   • Tobacco comments:     stopped @ age 29   Substance Use Topics   • Alcohol use: No   • Drug use: Never       Family History   Problem Relation Age of Onset   • Hypertension Biological Mother    • Osteoporosis Biological Mother    • Heart attack Biological Father         1st age 36, mild one after that and COD 39   • Heart disease Biological Father    • Hypertension Biological Father    • Heart disease Biological Brother 44        CABG x4   • No Known Problems Biological Brother    • Drug abuse Biological Brother        Allergies:  No known allergies    Current Outpatient Medications   Medication Sig Dispense Refill   • alendronate (FOSAMAX) 70 mg tablet TAKE 1 TABLET BY MOUTH ONCE A WEEK ON AN EMPTY STOMACH. DO NOT LIE DOWN FOR THE NEXT 30 MIN 12 tablet 1   • aspirin 81 mg enteric coated tablet Take 81 mg by mouth daily.     • atorvastatin (LIPITOR) 20 mg tablet Take 1 tablet (20 mg total) by mouth See admin instr. At Night 90 tablet 2   • cholecalciferol, vitamin D3, 1,000 unit capsule Take 2 capsules by mouth daily.     • levothyroxine (SYNTHROID) 75 mcg tablet TAKE 1 TABLET (75 MCG TOTAL) BY MOUTH DAILY. , BEFORE BREAKFAST, ON AN EMPTY STOMACH. 90 tablet 1   • multivitamin tablet Take by mouth daily. w Vit C and echinacea       No current facility-administered medications for this visit.       Review of Systems   Constitutional: Negative for diaphoresis.   HENT: Negative for hearing loss.    Eyes: Negative for visual disturbance.   Cardiovascular: Negative for chest pain, dyspnea on exertion, irregular heartbeat, leg swelling, near-syncope, orthopnea,  palpitations, paroxysmal nocturnal dyspnea and syncope.   Respiratory: Negative for shortness of breath.    Hematologic/Lymphatic: Does not bruise/bleed easily.   Skin: Negative for rash.   Musculoskeletal: Negative for myalgias.   Gastrointestinal: Negative for hematemesis, hematochezia and melena.   Genitourinary: Negative for hematuria.   Neurological: Negative for dizziness and light-headedness.   Psychiatric/Behavioral: Negative for altered mental status.       Objective      Vitals:    02/03/23 1044   BP: 128/78   Pulse: 67   SpO2: 99%       Wt Readings from Last 3 Encounters:   02/03/23 63 kg (139 lb)   11/23/22 62.1 kg (137 lb)   06/30/22 59.4 kg (131 lb)       Body mass index is 23.13 kg/m².    Physical Exam  Constitutional:       Appearance: She is well-developed.   HENT:      Head: Normocephalic and atraumatic.   Neck:      Vascular: No JVD.   Cardiovascular:      Rate and Rhythm: Normal rate and regular rhythm.      Pulses: Intact distal pulses.      Heart sounds: S1 normal and S2 normal. No murmur heard.  Pulmonary:      Effort: Pulmonary effort is normal.      Breath sounds: Normal breath sounds.   Abdominal:      General: Bowel sounds are normal.      Palpations: Abdomen is soft.   Skin:     General: Skin is warm.   Neurological:      Mental Status: She is alert.       ECG   Sinus  Rhythm 66 bpm  WITHIN NORMAL LIMITS      Hematology  Lab Results   Component Value Date    WBC 5.45 06/23/2022    HGB 12.9 06/23/2022    HCT 39.2 06/23/2022     06/23/2022    INR 0.9 08/25/2015       Chemistries  Lab Results   Component Value Date     (L) 06/23/2022    K 4.4 06/23/2022     06/23/2022    CREATININE 0.6 06/23/2022    BUN 9 06/23/2022    CO2 28 06/23/2022    GLUCOSE 94 06/23/2022    CALCIUM 9.5 06/23/2022    MG 2.0 08/26/2015    ALT 19 06/23/2022    AST 34 06/23/2022       Cholesterol  Lab Results   Component Value Date    CHOL 140 07/08/2022    TRIG 87 07/08/2022    HDL 62 07/08/2022     LDLCALC 62 07/08/2022       Endocrine  Lab Results   Component Value Date    TSH 5.51 06/23/2022    FREET4 0.83 06/23/2022       Cardiac Imaging    Echo 5/16/17:   1. Concenteric LVH with normal LV systolic wall function.   2. Grade I DD.   3. Aortic sclerosis.   4. Mild MR.   5. Mild TR.   6. Unchanged study compared to 10/8/15.     [facility] 8/25/15 [facility]  Findings :   1. 99% lesion in terminal 2 cm of the OM1 with ZENON 2 flow. The vessel was 1.25-1.5 mm in diameter.   2. There was mild anterolateral hypokinesis with LVEF 55%.  3. TR band RRA  No complications occurred during catheterization.   Recommendations :  No intervention was performed due to distal nature of disease, very small vessel size and the fact that she was pain free at the end of procedure.   Medical therapy for ACS with DAPT.   Add statin, low dose beta blocker.           Assessment/Plan      CAD (coronary artery disease)  Patient has history of CAD with hx of MI 8/2015- 99% occlusion of small OM1, treated medically. Last stress test negative 9/2017.   Continue aggressive risk factor modification.  She is on ASA 81 mg daily.  Blood pressure well controlled today off medications.  She was having atypical chest tightness which seemed MSK by description, it has now resolved.   She is walking on treadmill daily and feeling well with no recurrent chest pain.   Will defer stress testing for now    Lipid disorder   12/11/20 FLP: , TG 69, HDL 67, LDL 74 on Lipitor 20 mg.  7/8/22 FLP: , TG 87, HDL 62, LDL 62 on Lipitor 20 mg. LDL at goal.      No orders of the defined types were placed in this encounter.      There are no discontinued medications.    Orders Placed This Encounter   Procedures   • ECG 12 LEAD-OFFICE PERFORMED     Scheduling Instructions:      PLEASE USE THIS ORDER FOR ECG'S PERFORMED IN PHYSICIAN OFFICES     Order Specific Question:   Release to patient     Answer:   Immediate   • Transthoracic echo (TTE) complete      Standing Status:   Future     Standing Expiration Date:   2/3/2025     Order Specific Question:   Is Definity and/or agitated saline (bubbles) indicated for the study?     Answer:   No     Order Specific Question:   Release to patient     Answer:   Immediate       Follow Up Plans:  Return in about 6 months (around 8/3/2023).             I, Nikia Hunt, am scribing for, and in the presence of, Justine Ace.    I, Justine Ace, personally performed the services described in this documentation as scribed by Nikia Hunt in my presence, and it is both accurate and complete.       Justine Ace MD  2/3/2023

## 2023-02-03 NOTE — ASSESSMENT & PLAN NOTE
Patient has history of CAD with hx of MI 8/2015- 99% occlusion of small OM1, treated medically. Last stress test negative 9/2017.   Continue aggressive risk factor modification.  She is on ASA 81 mg daily.  Blood pressure well controlled today off medications.  She was having atypical chest tightness which seemed MSK by description, it has now resolved.   She is walking on treadmill daily and feeling well with no recurrent chest pain.   Will defer stress testing for now

## 2023-02-03 NOTE — ASSESSMENT & PLAN NOTE
12/11/20 FLP: , TG 69, HDL 67, LDL 74 on Lipitor 20 mg.  7/8/22 FLP: , TG 87, HDL 62, LDL 62 on Lipitor 20 mg. LDL at goal.

## 2023-03-11 LAB
T4 FREE SERPL-MCNC: 1.26 NG/DL (ref 0.82–1.77)
TSH SERPL DL<=0.005 MIU/L-ACNC: 3.65 UIU/ML (ref 0.45–4.5)

## 2023-03-13 ENCOUNTER — DOCUMENTATION (OUTPATIENT)
Dept: PRIMARY CARE | Facility: CLINIC | Age: 68
End: 2023-03-13
Payer: COMMERCIAL

## 2023-03-13 NOTE — RESULT ENCOUNTER NOTE
Let patient know her thyroid function tests are normal.  She should continue her same dose of levothyroxine.

## 2023-05-08 RX ORDER — ALENDRONATE SODIUM 70 MG/1
TABLET ORAL
Qty: 12 TABLET | Refills: 1 | Status: SHIPPED | OUTPATIENT
Start: 2023-05-08 | End: 2023-10-20

## 2023-05-08 NOTE — TELEPHONE ENCOUNTER
Medicine Refill Request    Last Office: 11/23/2022   Last Consult Visit: Visit date not found  Last Telemedicine Visit: 12/2/2020 Inés Peña, DO    Next Appointment: Visit date not found      Current Outpatient Medications:   •  alendronate (FOSAMAX) 70 mg tablet, TAKE 1 TABLET BY MOUTH ONCE A WEEK ON AN EMPTY STOMACH. DO NOT LIE DOWN FOR THE NEXT 30 MIN, Disp: 12 tablet, Rfl: 1  •  aspirin 81 mg enteric coated tablet, Take 81 mg by mouth daily., Disp: , Rfl:   •  atorvastatin (LIPITOR) 20 mg tablet, Take 1 tablet (20 mg total) by mouth See admin instr. At Night, Disp: 90 tablet, Rfl: 2  •  cholecalciferol, vitamin D3, 1,000 unit capsule, Take 2 capsules by mouth daily., Disp: , Rfl:   •  levothyroxine (SYNTHROID) 75 mcg tablet, TAKE 1 TABLET (75 MCG TOTAL) BY MOUTH DAILY. , BEFORE BREAKFAST, ON AN EMPTY STOMACH., Disp: 90 tablet, Rfl: 1  •  multivitamin tablet, Take by mouth daily. w Vit C and echinacea, Disp: , Rfl:       BP Readings from Last 3 Encounters:   02/03/23 128/78   12/07/22 (!) 141/73   11/23/22 126/78       Recent Lab results:  Lab Results   Component Value Date    CHOL 140 07/08/2022   ,   Lab Results   Component Value Date    HDL 62 07/08/2022   ,   Lab Results   Component Value Date    LDLCALC 62 07/08/2022   ,   Lab Results   Component Value Date    TRIG 87 07/08/2022        Lab Results   Component Value Date    GLUCOSE 94 06/23/2022   , No results found for: HGBA1C      Lab Results   Component Value Date    CREATININE 0.6 06/23/2022       Lab Results   Component Value Date    TSH 3.650 03/10/2023

## 2023-06-05 ENCOUNTER — TELEPHONE (OUTPATIENT)
Dept: CARDIOLOGY | Facility: CLINIC | Age: 68
End: 2023-06-05
Payer: COMMERCIAL

## 2023-06-05 NOTE — TELEPHONE ENCOUNTER
Pt is scheduled 08/04 at the [place] [facility] location    Transthoracic echo (TTE) complete    CPT   73767     npi [id number]

## 2023-06-23 ENCOUNTER — APPOINTMENT (OUTPATIENT)
Dept: URBAN - METROPOLITAN AREA CLINIC 203 | Age: 68
Setting detail: DERMATOLOGY
End: 2023-07-01

## 2023-06-23 DIAGNOSIS — D485 NEOPLASM OF UNCERTAIN BEHAVIOR OF SKIN: ICD-10-CM

## 2023-06-23 PROBLEM — D48.5 NEOPLASM OF UNCERTAIN BEHAVIOR OF SKIN: Status: ACTIVE | Noted: 2023-06-23

## 2023-06-23 PROCEDURE — 11102 TANGNTL BX SKIN SINGLE LES: CPT

## 2023-06-23 PROCEDURE — OTHER BIOPSY BY SHAVE METHOD: OTHER

## 2023-06-23 ASSESSMENT — LOCATION DETAILED DESCRIPTION DERM: LOCATION DETAILED: RIGHT MEDIAL SUPERIOR CHEST

## 2023-06-23 ASSESSMENT — LOCATION ZONE DERM: LOCATION ZONE: TRUNK

## 2023-06-23 ASSESSMENT — LOCATION SIMPLE DESCRIPTION DERM: LOCATION SIMPLE: CHEST

## 2023-06-26 ENCOUNTER — HOSPITAL ENCOUNTER (OUTPATIENT)
Facility: CLINIC | Age: 68
Discharge: HOME | End: 2023-06-26
Attending: INTERNAL MEDICINE
Payer: COMMERCIAL

## 2023-06-26 ENCOUNTER — APPOINTMENT (OUTPATIENT)
Dept: RADIOLOGY | Age: 68
End: 2023-06-26
Attending: INTERNAL MEDICINE
Payer: COMMERCIAL

## 2023-06-26 VITALS
TEMPERATURE: 98.1 F | DIASTOLIC BLOOD PRESSURE: 84 MMHG | HEART RATE: 74 BPM | OXYGEN SATURATION: 98 % | SYSTOLIC BLOOD PRESSURE: 132 MMHG

## 2023-06-26 DIAGNOSIS — S92.302A MULTIPLE CLOSED FRACTURES OF METATARSAL BONE OF LEFT FOOT, INITIAL ENCOUNTER: Primary | ICD-10-CM

## 2023-06-26 PROCEDURE — S9083 URGENT CARE CENTER GLOBAL: HCPCS | Performed by: INTERNAL MEDICINE

## 2023-06-26 PROCEDURE — 99213 OFFICE O/P EST LOW 20 MIN: CPT | Performed by: INTERNAL MEDICINE

## 2023-06-26 ASSESSMENT — ENCOUNTER SYMPTOMS
DIZZINESS: 0
NAUSEA: 0
VOMITING: 0
FEVER: 0
HEADACHES: 0
EXTREMITY NUMBNESS: 0
WOUND: 0
SHORTNESS OF BREATH: 0

## 2023-06-26 NOTE — ED PROVIDER NOTES
History  Chief Complaint   Patient presents with   • Foot Injury     Fell 10 fays ago twisted left foot off curb     Twisted ankle/top of foot  Slip and fall holding grocery bags trying to get in car during rain.      History provided by:  Patient   used: No    Foot Injury  Location:  Foot  Time since incident:  10 days  Injury: yes    Mechanism of injury: fall    Fall:     Entrapped after fall: no    Foot location:  L foot  Pain details:     Severity:  Mild    Duration:  10 days  Relieved by:  Ice, elevation and acetaminophen  Worsened by:  Bearing weight (walking)  Associated symptoms: swelling    Associated symptoms: no fever, no numbness and no tingling    Risk factors: known bone disorder    Risk factors: no concern for non-accidental trauma, no frequent fractures and no obesity    Risk factors comment:  Osteoporosis      Past Medical History:   Diagnosis Date   • Allergic rhinitis    • Arthritis    • Coronary artery disease    • Hyperlipidemia    • Hyperproteinemia     w neg heme eval   • Hypertension    • Hypothyroidism    • Lipid disorder    • Vertigo        Past Surgical History:   Procedure Laterality Date   • ADENOIDECTOMY     • CARDIAC CATHETERIZATION      w 90% occlusion 8/15   • CHOLECYSTECTOMY     • KNEE SURGERY Right     ORIF patella   • TONSILLECTOMY         Family History   Problem Relation Age of Onset   • Hypertension Biological Mother    • Osteoporosis Biological Mother    • Heart attack Biological Father         1st age 36, mild one after that and COD 39   • Heart disease Biological Father    • Hypertension Biological Father    • Heart disease Biological Brother 44        CABG x4   • No Known Problems Biological Brother    • Drug abuse Biological Brother        Social History     Tobacco Use   • Smoking status: Former   • Smokeless tobacco: Never   • Tobacco comments:     stopped @ age 29   Substance Use Topics   • Alcohol use: No   • Drug use: Never       Review of Systems    Constitutional: Negative for fever.   Respiratory: Negative for shortness of breath.    Cardiovascular: Negative for chest pain.   Gastrointestinal: Negative for nausea and vomiting.   Skin: Negative for wound.   Neurological: Negative for dizziness and headaches.       Physical Exam  ED Triage Vitals [06/26/23 1054]   Temp Heart Rate Resp BP SpO2   36.7 °C (98.1 °F) 74 -- 132/84 98 %      Temp Source Heart Rate Source Patient Position BP Location FiO2 (%) (Set)   Oral Monitor Sitting Right upper arm --       Physical Exam  Vitals reviewed.   Constitutional:       General: She is not in acute distress.     Appearance: She is not toxic-appearing.   Cardiovascular:      Rate and Rhythm: Normal rate and regular rhythm.      Heart sounds: No murmur heard.  Pulmonary:      Effort: Pulmonary effort is normal. No respiratory distress.      Breath sounds: No wheezing or rales.   Musculoskeletal:         General: Swelling, tenderness and signs of injury present. No deformity.      Comments: Examination left foot reveals soft tissue swelling and tenderness over the dorsal foot at the metatarsal level.  No significant tenderness over the medial lateral ankle.  Good dorsalis pedis pulse palpated.  No weakness.  Able to move her toes.  Good cap refill and gross sensation distally.   Skin:     General: Skin is warm and dry.      Capillary Refill: Capillary refill takes less than 2 seconds.      Findings: No rash.   Neurological:      General: No focal deficit present.   Psychiatric:         Mood and Affect: Mood normal.         Behavior: Behavior normal.           Procedures  Procedures    UC Course       Medical Decision Making  Preliminary review of the x-rays show fractures of the third and fourth metatarsals.  We will have her follow-up with Dr. Joshua.  Prescription for a rolling knee walker given.  Please see disposition for full care plan.                   Nish Covarrubias,   06/26/23 1138

## 2023-06-26 NOTE — DISCHARGE INSTRUCTIONS
Dr. Tosha [facility] orthopedics.  Please see the [facility] patient scheduling card.  Please call today and let the office know you have multiple metatarsal fractures and that the urgent care physician communicated directly with the specialist about the need for prompt follow-up.  Use the provided boot and crutches.  I have provided a prescription for a rolling knee walker which may be easier to use in the crutches.  You can likely find this at a medical supply store.  I did attach an ICD 10 code to this as well.    You can ice the foot in 20-minute intervals out of the boot 4-5 times a day over the next week.  Frequent elevation to continue.    Preliminary look at the x-rays show fractures of the third and fourth metatarsals.  The radiologist will review the films and I will call you with any significant variance.

## 2023-07-21 ENCOUNTER — TELEPHONE (OUTPATIENT)
Dept: PRIMARY CARE | Facility: CLINIC | Age: 68
End: 2023-07-21

## 2023-07-21 RX ORDER — LEVOTHYROXINE SODIUM 75 UG/1
75 TABLET ORAL
Qty: 90 TABLET | Refills: 1 | Status: SHIPPED | OUTPATIENT
Start: 2023-07-21 | End: 2024-01-15

## 2023-07-21 NOTE — TELEPHONE ENCOUNTER
Jimmy Myers, please refer to Dr. Peña's note below.       Return in about 1 year (around 11/23/2023) for physical, medication follow up.         Medicine Refill Request    Last Office Visit: 11/23/2022   Last Consult Visit: Visit date not found  Last Telemedicine Visit: 12/2/2020 Inés Peña, DO    Next Appointment: Visit date not found      Current Outpatient Medications:   •  alendronate (FOSAMAX) 70 mg tablet, TAKE 1 TABLET BY MOUTH ONCE A WEEK ON AN EMPTY STOMACH. DO NOT LIE DOWN FOR THE NEXT 30 MIN, Disp: 12 tablet, Rfl: 1  •  aspirin 81 mg enteric coated tablet, Take 81 mg by mouth daily., Disp: , Rfl:   •  atorvastatin (LIPITOR) 20 mg tablet, Take 1 tablet (20 mg total) by mouth See admin instr. At Night, Disp: 90 tablet, Rfl: 2  •  cholecalciferol, vitamin D3, 1,000 unit capsule, Take 2 capsules by mouth daily., Disp: , Rfl:   •  levothyroxine (SYNTHROID) 75 mcg tablet, TAKE 1 TABLET (75 MCG TOTAL) BY MOUTH DAILY. , BEFORE BREAKFAST, ON AN EMPTY STOMACH., Disp: 90 tablet, Rfl: 1  •  multivitamin tablet, Take by mouth daily. w Vit C and echinacea, Disp: , Rfl:       BP Readings from Last 3 Encounters:   06/26/23 132/84   02/03/23 128/78   12/07/22 (!) 141/73       Recent Lab results:  Lab Results   Component Value Date    CHOL 140 07/08/2022   ,   Lab Results   Component Value Date    HDL 62 07/08/2022   ,   Lab Results   Component Value Date    LDLCALC 62 07/08/2022   ,   Lab Results   Component Value Date    TRIG 87 07/08/2022        Lab Results   Component Value Date    GLUCOSE 94 06/23/2022   , No results found for: HGBA1C      Lab Results   Component Value Date    CREATININE 0.6 06/23/2022       Lab Results   Component Value Date    TSH 3.650 03/10/2023         No results found for: HGBA1C

## 2023-08-03 NOTE — PROGRESS NOTES
Cardiology  Office Progress Note         Reason for visit:   Chief Complaint   Patient presents with   • Follow-up       HPI     Dmoinga Dee is a 68 y.o. female who presents to the office for cardiovascular follow up and management of CAD (99% occlusion of small OM1), HLD, and chest tightness.      She was last seen in the office on 2/3/23. She was feeling great and walking on her treadmill daily without exertional symptoms. She denied any further chest discomfort. BP was well controlled off medications. I ordered an echo and asked her to f/u in 6 months.     Today she reports feeling great. She fractured some bones in her left foot and has been unable to exercise. She is wearing a boot and using crutches. She is non-weight bearing. She is hoping to be able to walk on it again soon. She is not in any pain. She denies any recurrence of chest discomfort.       Past Medical History:   Diagnosis Date   • Allergic rhinitis    • Arthritis    • Coronary artery disease    • Hyperlipidemia    • Hyperproteinemia     w neg heme eval   • Hypertension    • Hypothyroidism    • Lipid disorder    • Vertigo        Past Surgical History:   Procedure Laterality Date   • ADENOIDECTOMY     • CARDIAC CATHETERIZATION      w 90% occlusion 8/15   • CHOLECYSTECTOMY     • KNEE SURGERY Right     ORIF patella   • TONSILLECTOMY         Social History     Tobacco Use   • Smoking status: Former   • Smokeless tobacco: Never   • Tobacco comments:     stopped @ age 29   Vaping Use   • Vaping Use: Never used   Substance Use Topics   • Alcohol use: No   • Drug use: Never       Family History   Problem Relation Age of Onset   • Hypertension Biological Mother    • Osteoporosis Biological Mother    • Heart attack Biological Father         1st age 36, mild one after that and COD 39   • Heart disease Biological Father    • Hypertension Biological Father    • Heart disease Biological Brother 44        CABG x4   • No Known Problems Biological  Brother    • Drug abuse Biological Brother        Allergies:  No known allergies    Current Outpatient Medications   Medication Sig Dispense Refill   • alendronate (FOSAMAX) 70 mg tablet TAKE 1 TABLET BY MOUTH ONCE A WEEK ON AN EMPTY STOMACH. DO NOT LIE DOWN FOR THE NEXT 30 MIN 12 tablet 1   • aspirin 81 mg enteric coated tablet Take 81 mg by mouth daily.     • atorvastatin (LIPITOR) 20 mg tablet Take 1 tablet (20 mg total) by mouth See admin instr. At Night 90 tablet 2   • cholecalciferol, vitamin D3, 1,000 unit capsule Take 2 capsules by mouth daily.     • levothyroxine (SYNTHROID) 75 mcg tablet TAKE 1 TABLET (75 MCG TOTAL) BY MOUTH DAILY. , BEFORE BREAKFAST, ON AN EMPTY STOMACH. 90 tablet 1   • multivitamin tablet Take by mouth daily. w Vit C and echinacea       No current facility-administered medications for this visit.       Review of Systems   Constitutional: Negative for diaphoresis.   HENT: Negative for hearing loss.    Eyes: Negative for visual disturbance.   Cardiovascular: Negative for chest pain, dyspnea on exertion, irregular heartbeat, leg swelling, near-syncope, orthopnea, palpitations, paroxysmal nocturnal dyspnea and syncope.   Respiratory: Negative for shortness of breath.    Hematologic/Lymphatic: Does not bruise/bleed easily.   Skin: Negative for rash.   Musculoskeletal: Negative for myalgias.   Gastrointestinal: Negative for hematemesis, hematochezia and melena.   Genitourinary: Negative for hematuria.   Neurological: Negative for dizziness and light-headedness.   Psychiatric/Behavioral: Negative for altered mental status.       Objective     Vitals:    08/04/23 1247   BP: 126/74   Pulse:    SpO2:        Wt Readings from Last 3 Encounters:   08/04/23 62.6 kg (138 lb)   08/04/23 62.6 kg (138 lb)   02/03/23 63 kg (139 lb)       Body mass index is 22.96 kg/m².    Physical Exam  Constitutional:       Appearance: She is well-developed.   HENT:      Head: Normocephalic and atraumatic.   Neck:       Vascular: No JVD.   Cardiovascular:      Rate and Rhythm: Normal rate and regular rhythm.      Pulses: Intact distal pulses.      Heart sounds: S1 normal and S2 normal. No murmur heard.  Pulmonary:      Effort: Pulmonary effort is normal.      Breath sounds: Normal breath sounds.   Abdominal:      General: Bowel sounds are normal.      Palpations: Abdomen is soft.   Musculoskeletal:      Comments: Orthopedic boot on LLE   Skin:     General: Skin is warm.   Neurological:      Mental Status: She is alert.       ECG   Normal sinus rhythm 62 bpm  Normal ECG  When compared with ECG of 23-JUN-2022 10:49,  Vent. rate has decreased BY 30 BPM      Hematology  Lab Results   Component Value Date    WBC 5.45 06/23/2022    HGB 12.9 06/23/2022    HCT 39.2 06/23/2022     06/23/2022    INR 0.9 08/25/2015       Chemistries  Lab Results   Component Value Date     (L) 06/23/2022    K 4.4 06/23/2022     06/23/2022    CREATININE 0.6 06/23/2022    BUN 9 06/23/2022    CO2 28 06/23/2022    GLUCOSE 94 06/23/2022    CALCIUM 9.5 06/23/2022    MG 2.0 08/26/2015    ALT 19 06/23/2022    AST 34 06/23/2022       Cholesterol  Lab Results   Component Value Date    CHOL 140 07/08/2022    TRIG 87 07/08/2022    HDL 62 07/08/2022    LDLCALC 62 07/08/2022       Endocrine  Lab Results   Component Value Date    TSH 3.650 03/10/2023    FREET4 0.83 06/23/2022       Cardiac Imaging    Echo 8/4/23  •  Left Ventricle: Normal ventricle size. Mild concentric left ventricular hypertrophy. Normal systolic function. Estimated EF 65-70%. No regional wall motion abnormalities. Normal septal motion. Normal diastolic filling pattern for age.  •  Right Ventricle: Normal ventricle size. Normal systolic function.  •  Aortic Valve: Tricuspid valve.  Sclerotic leaflets. No regurgitation. No stenosis.  •  Tricuspid Valve: Normal structure. Trace regurgitation. Estimated RVSP = 18 mmHg.    Echo 5/16/17:   1. Concenteric LVH with normal LV systolic wall  function.   2. Grade I DD.   3. Aortic sclerosis.   4. Mild MR.   5. Mild TR.   6. Unchanged study compared to 10/8/15.      [facility]  Findings :   1. 99% lesion in terminal 2 cm of the OM1 with ZENON 2 flow. The vessel was 1.25-1.5 mm in diameter.   2. There was mild anterolateral hypokinesis with LVEF 55%.  3. TR band RRA  No complications occurred during catheterization.   Recommendations :  No intervention was performed due to distal nature of disease, very small vessel size and the fact that she was pain free at the end of procedure.   Medical therapy for ACS with DAPT.   Add statin, low dose beta blocker.          Assessment/Plan     CAD (coronary artery disease)  Patient has history of CAD with hx of MI 8/2015- 99% occlusion of small OM1, treated medically. Last stress test negative 9/2017.   Continue aggressive risk factor modification.  She is on ASA 81 mg daily.  She is walking on treadmill daily and feeling well with no recurrent chest pain.   Echo today shows normal EF with no WMA.  Will defer stress testing for now    Lipid disorder   12/11/20 FLP: , TG 69, HDL 67, LDL 74 on Lipitor 20 mg.  7/8/22 FLP: , TG 87, HDL 62, LDL 62 on Lipitor 20 mg. LDL at goal.    Elevated blood pressure reading in office without diagnosis of hypertension  Blood pressure is mildly elevated today, improved at repeat measurements.  Echo today normal except mild LVH.  Patient has an automatic upper arm home blood pressure cuff. I have recommended she starts monitoring the blood pressure once or twice a day. Goal blood pressure is less than 130/80. Also watched the AHA Blood pressure measurement video.           No orders of the defined types were placed in this encounter.      There are no discontinued medications.    Orders Placed This Encounter   Procedures   • [facility] MUSE ECG 12 lead (clinic performed)     Scheduling Instructions:      PLEASE USE THIS ORDER FOR ECG'S PERFORMED IN PHYSICIAN  OFFICES     Order Specific Question:   Release to patient     Answer:   Immediate       Follow Up Plans:  Return in about 6 weeks (around 9/15/2023).              I, Nikia Hunt, am scribing for, and in the presence of, Justine Ace.    I, Justine Ace, personally performed the services described in this documentation as scribed by Nikia Hunt in my presence, and it is both accurate and complete.       Justine Ace MD  8/4/2023

## 2023-08-04 ENCOUNTER — OFFICE VISIT (OUTPATIENT)
Dept: CARDIOLOGY | Facility: CLINIC | Age: 68
End: 2023-08-04
Payer: COMMERCIAL

## 2023-08-04 ENCOUNTER — HOSPITAL ENCOUNTER (OUTPATIENT)
Dept: CARDIOLOGY | Facility: CLINIC | Age: 68
Discharge: HOME | End: 2023-08-04
Attending: INTERNAL MEDICINE
Payer: COMMERCIAL

## 2023-08-04 VITALS
HEART RATE: 67 BPM | BODY MASS INDEX: 22.99 KG/M2 | OXYGEN SATURATION: 99 % | WEIGHT: 138 LBS | HEIGHT: 65 IN | SYSTOLIC BLOOD PRESSURE: 126 MMHG | DIASTOLIC BLOOD PRESSURE: 74 MMHG

## 2023-08-04 VITALS
BODY MASS INDEX: 22.99 KG/M2 | DIASTOLIC BLOOD PRESSURE: 82 MMHG | WEIGHT: 138 LBS | SYSTOLIC BLOOD PRESSURE: 130 MMHG | HEIGHT: 65 IN

## 2023-08-04 DIAGNOSIS — I25.10 CORONARY ARTERY DISEASE INVOLVING NATIVE CORONARY ARTERY OF NATIVE HEART WITHOUT ANGINA PECTORIS: ICD-10-CM

## 2023-08-04 DIAGNOSIS — I25.10 CORONARY ARTERY DISEASE INVOLVING NATIVE CORONARY ARTERY OF NATIVE HEART WITHOUT ANGINA PECTORIS: Primary | ICD-10-CM

## 2023-08-04 DIAGNOSIS — E78.9 LIPID DISORDER: ICD-10-CM

## 2023-08-04 DIAGNOSIS — R07.9 CHEST PAIN, UNSPECIFIED TYPE: ICD-10-CM

## 2023-08-04 DIAGNOSIS — R03.0 ELEVATED BLOOD PRESSURE READING IN OFFICE WITHOUT DIAGNOSIS OF HYPERTENSION: ICD-10-CM

## 2023-08-04 LAB
AORTIC ROOT ANNULUS: 3 CM
ASCENDING AORTA: 3.6 CM
ATRIAL RATE: 62
AV PEAK GRADIENT: 7 MMHG
AV PEAK VELOCITY-S: 1.3 M/S
AV VALVE AREA: 1.7 CM2
BSA FOR ECHO PROCEDURE: 1.69 M2
CUSP SEPARATION: 1.5 CM
E WAVE DECELERATION TIME: 338 MS
E/A RATIO: 1
E/E' RATIO: 9.9
E/LAT E' RATIO: 6.6
EDV (BP): 69.6 CM3
EF (A4C): 66 %
EF A2C: 66.2 %
EJECTION FRACTION: 65.8 %
EST RIGHT VENT SYSTOLIC PRESSURE BY TRICUSPID REGURGITATION JET: 18 MMHG
ESV (BP): 23.8 CM3
FRACTIONAL SHORTENING: 34.57 %
INTERVENTRICULAR SEPTUM: 1.36 CM
LA ESV (BP): 42.3 CM3
LA ESV INDEX (A2C): 24.62 CM3/M2
LA ESV INDEX (BP): 25.03 CM3/M2
LA/AORTA RATIO: 0.97
LAAS-AP2: 15.1 CM2
LAAS-AP4: 15.6 CM2
LAD 2D: 2.9 CM
LALD A4C: 4.44 CM
LALD A4C: 4.84 CM
LAV-S: 41.6 CM3
LEFT ATRIUM VOLUME INDEX: 23.49 CM3/M2
LEFT ATRIUM VOLUME: 39.7 CM3
LEFT INTERNAL DIMENSION IN SYSTOLE: 2.65 CM (ref 2.37–3.59)
LEFT VENTRICLE DIASTOLIC VOLUME INDEX: 34.85 CM3/M2
LEFT VENTRICLE DIASTOLIC VOLUME: 58.9 CM3
LEFT VENTRICLE SYSTOLIC VOLUME INDEX: 11.83 CM3/M2
LEFT VENTRICLE SYSTOLIC VOLUME: 20 CM3
LEFT VENTRICULAR INTERNAL DIMENSION IN DIASTOLE: 4.05 CM (ref 4–5.55)
LEFT VENTRICULAR POSTERIOR WALL IN END DIASTOLE: 1.32 CM (ref 0.52–0.97)
LV DIASTOLIC VOLUME: 82.3 CM3
LV ESV (APICAL 2 CHAMBER): 27.8 CM3
LVAD-AP2: 26.8 CM2
LVAD-AP4: 22.9 CM2
LVAS-AP2: 14.1 CM2
LVAS-AP4: 12 CM2
LVEDVI(A2C): 48.7 CM3/M2
LVEDVI(BP): 41.18 CM3/M2
LVESVI(A2C): 16.45 CM3/M2
LVESVI(BP): 14.08 CM3/M2
LVLD-AP2: 7.32 CM
LVLD-AP4: 7.31 CM
LVLS-AP2: 6.27 CM
LVLS-AP4: 6.15 CM
LVOT 2D: 2.2 CM
LVOT A: 3.8 CM2
LVOT PEAK VELOCITY: 0.74 M/S
LVOT PG: 2 MMHG
MLH CV ECHO AVA INDEX VELOCITY RATIO: 1
MV E'TISSUE VEL-LAT: 0.1 M/S
MV E'TISSUE VEL-MED: 0.07 M/S
MV PEAK A VEL: 0.64 M/S
MV PEAK E VEL: 0.67 M/S
P AXIS: 66
POSTERIOR WALL: 1.32 CM
PR INTERVAL: 160
PV MEAN GRADIENT: 2 MMHG
PV MEAN VELOCITY: 0.61 M/S
PV PEAK GRADIENT: 3 MMHG
PV PV: 0.91 M/S
QRS DURATION: 96
QT INTERVAL: 422
QTC CALCULATION(BAZETT): 428
R AXIS: 29
RAP: 3 MMHG
RVOT VMAX: 0.73 M/S
RVOT VTI: 16 CM
SEPTAL TISSUE DOPPLER FREE WALL LATE DIA VELOCITY (APICAL 4 CHAMBER VIEW): 0.11 M/S
T WAVE AXIS: 67
TR MAX PG: 14.75 MMHG
TRICUSPID VALVE PEAK REGURGITATION VELOCITY: 1.92 M/S
VENTRICULAR RATE: 62
Z-SCORE OF LEFT VENTRICULAR DIMENSION IN END DIASTOLE: -1.5
Z-SCORE OF LEFT VENTRICULAR DIMENSION IN END SYSTOLE: -0.75
Z-SCORE OF LEFT VENTRICULAR POSTERIOR WALL IN END DIASTOLE: 3.23

## 2023-08-04 PROCEDURE — 93306 TTE W/DOPPLER COMPLETE: CPT | Performed by: INTERNAL MEDICINE

## 2023-08-04 PROCEDURE — 3008F BODY MASS INDEX DOCD: CPT | Performed by: INTERNAL MEDICINE

## 2023-08-04 PROCEDURE — 99214 OFFICE O/P EST MOD 30 MIN: CPT | Performed by: INTERNAL MEDICINE

## 2023-08-04 PROCEDURE — 93000 ELECTROCARDIOGRAM COMPLETE: CPT | Performed by: INTERNAL MEDICINE

## 2023-08-04 ASSESSMENT — ENCOUNTER SYMPTOMS
ALTERED MENTAL STATUS: 0
HEMATOCHEZIA: 0
NEAR-SYNCOPE: 0
HEMATURIA: 0
DIAPHORESIS: 0
BRUISES/BLEEDS EASILY: 0
SYNCOPE: 0
PALPITATIONS: 0
MYALGIAS: 0
ORTHOPNEA: 0
HEMATEMESIS: 0
IRREGULAR HEARTBEAT: 0
DYSPNEA ON EXERTION: 0
LIGHT-HEADEDNESS: 0
PND: 0
DIZZINESS: 0
SHORTNESS OF BREATH: 0

## 2023-08-04 NOTE — LETTER
August 4, 2023     Inés Peña DO  [address]  [place] [place] [id number]    Patient: Dominga Dee  YOB: 1955  Date of Visit: 8/4/2023      Dear Dr. Peña:    Thank you for referring Dominga Dee to me for evaluation. Below are my notes for this consultation.    If you have questions, please do not hesitate to call me. I look forward to following your patient along with you.         Sincerely,        Justine Ace MD        CC: Justine Jin MD  8/4/2023  1:13 PM  Signed       Cardiology  Office Progress Note         Reason for visit:   Chief Complaint   Patient presents with   • Follow-up       HPI     Dominga Dee is a 68 y.o. female who presents to the office for cardiovascular follow up and management of CAD (99% occlusion of small OM1), HLD, and chest tightness.      She was last seen in the office on 2/3/23. She was feeling great and walking on her treadmill daily without exertional symptoms. She denied any further chest discomfort. BP was well controlled off medications. I ordered an echo and asked her to f/u in 6 months.     Today she reports feeling great. She fractured some bones in her left foot and has been unable to exercise. She is wearing a boot and using crutches. She is non-weight bearing. She is hoping to be able to walk on it again soon. She is not in any pain. She denies any recurrence of chest discomfort.       Past Medical History:   Diagnosis Date   • Allergic rhinitis    • Arthritis    • Coronary artery disease    • Hyperlipidemia    • Hyperproteinemia     w neg heme eval   • Hypertension    • Hypothyroidism    • Lipid disorder    • Vertigo        Past Surgical History:   Procedure Laterality Date   • ADENOIDECTOMY     • CARDIAC CATHETERIZATION      w 90% occlusion 8/15   • CHOLECYSTECTOMY     • KNEE SURGERY Right     ORIF patella   • TONSILLECTOMY         Social History     Tobacco Use   • Smoking  status: Former   • Smokeless tobacco: Never   • Tobacco comments:     stopped @ age 29   Vaping Use   • Vaping Use: Never used   Substance Use Topics   • Alcohol use: No   • Drug use: Never       Family History   Problem Relation Age of Onset   • Hypertension Biological Mother    • Osteoporosis Biological Mother    • Heart attack Biological Father         1st age 36, mild one after that and COD 39   • Heart disease Biological Father    • Hypertension Biological Father    • Heart disease Biological Brother 44        CABG x4   • No Known Problems Biological Brother    • Drug abuse Biological Brother        Allergies:  No known allergies    Current Outpatient Medications   Medication Sig Dispense Refill   • alendronate (FOSAMAX) 70 mg tablet TAKE 1 TABLET BY MOUTH ONCE A WEEK ON AN EMPTY STOMACH. DO NOT LIE DOWN FOR THE NEXT 30 MIN 12 tablet 1   • aspirin 81 mg enteric coated tablet Take 81 mg by mouth daily.     • atorvastatin (LIPITOR) 20 mg tablet Take 1 tablet (20 mg total) by mouth See admin instr. At Night 90 tablet 2   • cholecalciferol, vitamin D3, 1,000 unit capsule Take 2 capsules by mouth daily.     • levothyroxine (SYNTHROID) 75 mcg tablet TAKE 1 TABLET (75 MCG TOTAL) BY MOUTH DAILY. , BEFORE BREAKFAST, ON AN EMPTY STOMACH. 90 tablet 1   • multivitamin tablet Take by mouth daily. w Vit C and echinacea       No current facility-administered medications for this visit.       Review of Systems   Constitutional: Negative for diaphoresis.   HENT: Negative for hearing loss.    Eyes: Negative for visual disturbance.   Cardiovascular: Negative for chest pain, dyspnea on exertion, irregular heartbeat, leg swelling, near-syncope, orthopnea, palpitations, paroxysmal nocturnal dyspnea and syncope.   Respiratory: Negative for shortness of breath.    Hematologic/Lymphatic: Does not bruise/bleed easily.   Skin: Negative for rash.   Musculoskeletal: Negative for myalgias.   Gastrointestinal: Negative for hematemesis,  hematochezia and melena.   Genitourinary: Negative for hematuria.   Neurological: Negative for dizziness and light-headedness.   Psychiatric/Behavioral: Negative for altered mental status.       Objective     Vitals:    08/04/23 1247   BP: 126/74   Pulse:    SpO2:        Wt Readings from Last 3 Encounters:   08/04/23 62.6 kg (138 lb)   08/04/23 62.6 kg (138 lb)   02/03/23 63 kg (139 lb)       Body mass index is 22.96 kg/m².    Physical Exam  Constitutional:       Appearance: She is well-developed.   HENT:      Head: Normocephalic and atraumatic.   Neck:      Vascular: No JVD.   Cardiovascular:      Rate and Rhythm: Normal rate and regular rhythm.      Pulses: Intact distal pulses.      Heart sounds: S1 normal and S2 normal. No murmur heard.  Pulmonary:      Effort: Pulmonary effort is normal.      Breath sounds: Normal breath sounds.   Abdominal:      General: Bowel sounds are normal.      Palpations: Abdomen is soft.   Musculoskeletal:      Comments: Orthopedic boot on LLE   Skin:     General: Skin is warm.   Neurological:      Mental Status: She is alert.       ECG   Normal sinus rhythm 62 bpm  Normal ECG  When compared with ECG of 23-JUN-2022 10:49,  Vent. rate has decreased BY 30 BPM      Hematology  Lab Results   Component Value Date    WBC 5.45 06/23/2022    HGB 12.9 06/23/2022    HCT 39.2 06/23/2022     06/23/2022    INR 0.9 08/25/2015       Chemistries  Lab Results   Component Value Date     (L) 06/23/2022    K 4.4 06/23/2022     06/23/2022    CREATININE 0.6 06/23/2022    BUN 9 06/23/2022    CO2 28 06/23/2022    GLUCOSE 94 06/23/2022    CALCIUM 9.5 06/23/2022    MG 2.0 08/26/2015    ALT 19 06/23/2022    AST 34 06/23/2022       Cholesterol  Lab Results   Component Value Date    CHOL 140 07/08/2022    TRIG 87 07/08/2022    HDL 62 07/08/2022    LDLCALC 62 07/08/2022       Endocrine  Lab Results   Component Value Date    TSH 3.650 03/10/2023    FREET4 0.83 06/23/2022       Cardiac  Imaging    Echo 8/4/23  •  Left Ventricle: Normal ventricle size. Mild concentric left ventricular hypertrophy. Normal systolic function. Estimated EF 65-70%. No regional wall motion abnormalities. Normal septal motion. Normal diastolic filling pattern for age.  •  Right Ventricle: Normal ventricle size. Normal systolic function.  •  Aortic Valve: Tricuspid valve.  Sclerotic leaflets. No regurgitation. No stenosis.  •  Tricuspid Valve: Normal structure. Trace regurgitation. Estimated RVSP = 18 mmHg.    Echo 5/16/17:   1. Concenteric LVH with normal LV systolic wall function.   2. Grade I DD.   3. Aortic sclerosis.   4. Mild MR.   5. Mild TR.   6. Unchanged study compared to 10/8/15.      [facility] 8/25/15 [facility]  Findings :   1. 99% lesion in terminal 2 cm of the OM1 with ZENON 2 flow. The vessel was 1.25-1.5 mm in diameter.   2. There was mild anterolateral hypokinesis with LVEF 55%.  3. TR band RRA  No complications occurred during catheterization.   Recommendations :  No intervention was performed due to distal nature of disease, very small vessel size and the fact that she was pain free at the end of procedure.   Medical therapy for ACS with DAPT.   Add statin, low dose beta blocker.          Assessment/Plan     CAD (coronary artery disease)  Patient has history of CAD with hx of MI 8/2015- 99% occlusion of small OM1, treated medically. Last stress test negative 9/2017.   Continue aggressive risk factor modification.  She is on ASA 81 mg daily.  She is walking on treadmill daily and feeling well with no recurrent chest pain.   Echo today shows normal EF with no WMA.  Will defer stress testing for now    Lipid disorder   12/11/20 FLP: , TG 69, HDL 67, LDL 74 on Lipitor 20 mg.  7/8/22 FLP: , TG 87, HDL 62, LDL 62 on Lipitor 20 mg. LDL at goal.    Elevated blood pressure reading in office without diagnosis of hypertension  Blood pressure is mildly elevated today, improved at repeat  measurements.  Echo today normal except mild LVH.  Patient has an automatic upper arm home blood pressure cuff. I have recommended she starts monitoring the blood pressure once or twice a day. Goal blood pressure is less than 130/80. Also watched the AHA Blood pressure measurement video.           No orders of the defined types were placed in this encounter.      There are no discontinued medications.    Orders Placed This Encounter   Procedures   • [facility] MUSE ECG 12 lead (clinic performed)     Scheduling Instructions:      PLEASE USE THIS ORDER FOR ECG'S PERFORMED IN PHYSICIAN OFFICES     Order Specific Question:   Release to patient     Answer:   Immediate       Follow Up Plans:  Return in about 6 weeks (around 9/15/2023).             I, Nikia Hunt, am scribing for, and in the presence of, Justine Ace.    I, Justine Ace, personally performed the services described in this documentation as scribed by Nikia Hunt in my presence, and it is both accurate and complete.       Justine Ace MD  8/4/2023

## 2023-08-04 NOTE — ASSESSMENT & PLAN NOTE
Blood pressure is mildly elevated today, improved at repeat measurements.  Echo today normal except mild LVH.  Patient has an automatic upper arm home blood pressure cuff. I have recommended she starts monitoring the blood pressure once or twice a day. Goal blood pressure is less than 130/80. Also watched the AHA Blood pressure measurement video.

## 2023-08-04 NOTE — PATIENT INSTRUCTIONS
"Purchase an OMRON cuff or any automatic Upper ARM home blood pressure cuff and start monitoring the blood pressure once or twice a day,  5-7 x a week.   Goal blood pressure is less than 130/80.   Google \"AHA Blood pressure measurement video\"    "

## 2023-08-04 NOTE — ASSESSMENT & PLAN NOTE
Patient has history of CAD with hx of MI 8/2015- 99% occlusion of small OM1, treated medically. Last stress test negative 9/2017.   Continue aggressive risk factor modification.  She is on ASA 81 mg daily.  She is walking on treadmill daily and feeling well with no recurrent chest pain.   Echo today shows normal EF with no WMA.  Will defer stress testing for now

## 2023-08-17 DIAGNOSIS — Z13.9 ENCOUNTER FOR SCREENING: ICD-10-CM

## 2023-08-17 DIAGNOSIS — E03.9 ACQUIRED HYPOTHYROIDISM: Primary | ICD-10-CM

## 2023-08-17 RX ORDER — ATORVASTATIN CALCIUM 20 MG/1
TABLET, FILM COATED ORAL
Qty: 90 TABLET | Refills: 1 | Status: SHIPPED | OUTPATIENT
Start: 2023-08-17 | End: 2024-02-09

## 2023-08-17 NOTE — TELEPHONE ENCOUNTER
Jimmy Mcallister, please refer to Dr. Peña's note below from patient's last OV on 11/23/22.    Return in about 1 year (around 11/23/2023) for physical, medication follow up    Medicine Refill Request    Last Office Visit: 11/23/2022   Last Consult Visit: Visit date not found  Last Telemedicine Visit: 12/2/2020 Inés Peña, DO    Next Appointment: 1/12/2024      Current Outpatient Medications:   •  alendronate (FOSAMAX) 70 mg tablet, TAKE 1 TABLET BY MOUTH ONCE A WEEK ON AN EMPTY STOMACH. DO NOT LIE DOWN FOR THE NEXT 30 MIN, Disp: 12 tablet, Rfl: 1  •  aspirin 81 mg enteric coated tablet, Take 81 mg by mouth daily., Disp: , Rfl:   •  atorvastatin (LIPITOR) 20 mg tablet, Take 1 tablet (20 mg total) by mouth See admin instr. At Night, Disp: 90 tablet, Rfl: 2  •  cholecalciferol, vitamin D3, 1,000 unit capsule, Take 2 capsules by mouth daily., Disp: , Rfl:   •  levothyroxine (SYNTHROID) 75 mcg tablet, TAKE 1 TABLET (75 MCG TOTAL) BY MOUTH DAILY. , BEFORE BREAKFAST, ON AN EMPTY STOMACH., Disp: 90 tablet, Rfl: 1  •  multivitamin tablet, Take by mouth daily. w Vit C and echinacea, Disp: , Rfl:       BP Readings from Last 3 Encounters:   08/04/23 130/82   08/04/23 126/74   06/26/23 132/84       Recent Lab results:  Lab Results   Component Value Date    CHOL 140 07/08/2022   ,   Lab Results   Component Value Date    HDL 62 07/08/2022   ,   Lab Results   Component Value Date    LDLCALC 62 07/08/2022   ,   Lab Results   Component Value Date    TRIG 87 07/08/2022        Lab Results   Component Value Date    GLUCOSE 94 06/23/2022   , No results found for: HGBA1C      Lab Results   Component Value Date    CREATININE 0.6 06/23/2022       Lab Results   Component Value Date    TSH 3.650 03/10/2023         No results found for: HGBA1C

## 2023-08-18 NOTE — TELEPHONE ENCOUNTER
Its ok if keeps Physical for January but can you order and let her know to do her labwork now - she is overdue for CMP, lipids and TSH now 12 hrs fasting

## 2023-08-18 NOTE — TELEPHONE ENCOUNTER
"Called patient to give her Dr. Peña's recommendations as indicated below:      \"It's ok if keeps Physical for January but can you order and let her know to do her labwork now - she is overdue for CMP, lipids and TSH now 12 hrs fasting\"              "

## 2023-09-02 LAB
ALBUMIN SERPL-MCNC: 4.4 G/DL (ref 3.9–4.9)
ALBUMIN/GLOB SERPL: 1.1 {RATIO} (ref 1.2–2.2)
ALP SERPL-CCNC: 73 IU/L (ref 44–121)
ALT SERPL-CCNC: 13 IU/L (ref 0–32)
AST SERPL-CCNC: 25 IU/L (ref 0–40)
BILIRUB SERPL-MCNC: 0.4 MG/DL (ref 0–1.2)
BUN SERPL-MCNC: 9 MG/DL (ref 8–27)
BUN/CREAT SERPL: 16 (ref 12–28)
CALCIUM SERPL-MCNC: 9.6 MG/DL (ref 8.7–10.3)
CHLORIDE SERPL-SCNC: 98 MMOL/L (ref 96–106)
CHOLEST SERPL-MCNC: 156 MG/DL (ref 100–199)
CO2 SERPL-SCNC: 24 MMOL/L (ref 20–29)
CREAT SERPL-MCNC: 0.58 MG/DL (ref 0.57–1)
EGFRCR SERPLBLD CKD-EPI 2021: 98 ML/MIN/1.73
GLOBULIN SER CALC-MCNC: 3.9 G/DL (ref 1.5–4.5)
GLUCOSE SERPL-MCNC: 82 MG/DL (ref 70–99)
HDLC SERPL-MCNC: 71 MG/DL
LDLC SERPL CALC-MCNC: 74 MG/DL (ref 0–99)
POTASSIUM SERPL-SCNC: 4.7 MMOL/L (ref 3.5–5.2)
PROT SERPL-MCNC: 8.3 G/DL (ref 6–8.5)
SODIUM SERPL-SCNC: 137 MMOL/L (ref 134–144)
TRIGL SERPL-MCNC: 53 MG/DL (ref 0–149)
TSH SERPL DL<=0.005 MIU/L-ACNC: 4.41 UIU/ML (ref 0.45–4.5)
VLDLC SERPL CALC-MCNC: 11 MG/DL (ref 5–40)

## 2023-10-19 NOTE — TELEPHONE ENCOUNTER
"Medicine Refill Request    Last Office Visit: 11/23/2022   Last Consult Visit: Visit date not found  Last Telemedicine Visit: 12/2/2020 Inés Peña,     Next Appointment: 1/12/2024      Current Outpatient Medications:     alendronate (FOSAMAX) 70 mg tablet, TAKE 1 TABLET BY MOUTH ONCE A WEEK ON AN EMPTY STOMACH. DO NOT LIE DOWN FOR THE NEXT 30 MIN, Disp: 12 tablet, Rfl: 1    aspirin 81 mg enteric coated tablet, Take 81 mg by mouth daily., Disp: , Rfl:     atorvastatin (LIPITOR) 20 mg tablet, TAKE 1 TABLET (20 MG TOTAL) BY MOUTH AT NIGHT, Disp: 90 tablet, Rfl: 1    cholecalciferol, vitamin D3, 1,000 unit capsule, Take 2 capsules by mouth daily., Disp: , Rfl:     levothyroxine (SYNTHROID) 75 mcg tablet, TAKE 1 TABLET (75 MCG TOTAL) BY MOUTH DAILY. , BEFORE BREAKFAST, ON AN EMPTY STOMACH., Disp: 90 tablet, Rfl: 1    multivitamin tablet, Take by mouth daily. w Vit C and echinacea, Disp: , Rfl:       BP Readings from Last 3 Encounters:   08/04/23 130/82   08/04/23 126/74   06/26/23 132/84       Recent Lab results:  Lab Results   Component Value Date    CHOL 156 09/01/2023   ,   Lab Results   Component Value Date    HDL 71 09/01/2023   ,   Lab Results   Component Value Date    LDLCALC 74 09/01/2023   ,   Lab Results   Component Value Date    TRIG 53 09/01/2023        Lab Results   Component Value Date    GLUCOSE 82 09/01/2023   , No results found for: \"HGBA1C\"      Lab Results   Component Value Date    CREATININE 0.58 09/01/2023       Lab Results   Component Value Date    TSH 4.410 09/01/2023         No results found for: \"HGBA1C\"    "

## 2023-10-20 RX ORDER — ALENDRONATE SODIUM 70 MG/1
TABLET ORAL
Qty: 12 TABLET | Refills: 1 | Status: SHIPPED | OUTPATIENT
Start: 2023-10-20 | End: 2024-06-30

## 2023-12-14 DIAGNOSIS — E03.9 ACQUIRED HYPOTHYROIDISM: Primary | ICD-10-CM

## 2023-12-14 DIAGNOSIS — E78.9 LIPID DISORDER: ICD-10-CM

## 2023-12-23 LAB
CHOLEST SERPL-MCNC: 142 MG/DL (ref 100–199)
HDLC SERPL-MCNC: 68 MG/DL
LDLC SERPL CALC-MCNC: 61 MG/DL (ref 0–99)
T4 FREE SERPL-MCNC: 1.22 NG/DL (ref 0.82–1.77)
TRIGL SERPL-MCNC: 64 MG/DL (ref 0–149)
TSH SERPL DL<=0.005 MIU/L-ACNC: 1.99 UIU/ML (ref 0.45–4.5)
VLDLC SERPL CALC-MCNC: 13 MG/DL (ref 5–40)

## 2024-01-05 ENCOUNTER — HOSPITAL ENCOUNTER (OUTPATIENT)
Dept: RADIOLOGY | Facility: HOSPITAL | Age: 69
Discharge: HOME | End: 2024-01-05
Attending: FAMILY MEDICINE
Payer: COMMERCIAL

## 2024-01-05 DIAGNOSIS — Z12.31 ENCOUNTER FOR SCREENING MAMMOGRAM FOR MALIGNANT NEOPLASM OF BREAST: ICD-10-CM

## 2024-01-05 PROCEDURE — 77063 BREAST TOMOSYNTHESIS BI: CPT

## 2024-01-12 ENCOUNTER — OFFICE VISIT (OUTPATIENT)
Dept: PRIMARY CARE | Facility: CLINIC | Age: 69
End: 2024-01-12
Payer: COMMERCIAL

## 2024-01-12 VITALS
HEART RATE: 75 BPM | DIASTOLIC BLOOD PRESSURE: 72 MMHG | BODY MASS INDEX: 22.99 KG/M2 | HEIGHT: 65 IN | SYSTOLIC BLOOD PRESSURE: 128 MMHG | WEIGHT: 138 LBS | OXYGEN SATURATION: 98 % | RESPIRATION RATE: 16 BRPM

## 2024-01-12 DIAGNOSIS — M81.0 AGE-RELATED OSTEOPOROSIS WITHOUT CURRENT PATHOLOGICAL FRACTURE: ICD-10-CM

## 2024-01-12 DIAGNOSIS — Z12.31 ENCOUNTER FOR SCREENING MAMMOGRAM FOR MALIGNANT NEOPLASM OF BREAST: ICD-10-CM

## 2024-01-12 DIAGNOSIS — Z00.00 ROUTINE PHYSICAL EXAMINATION: Primary | ICD-10-CM

## 2024-01-12 DIAGNOSIS — E78.9 LIPID DISORDER: ICD-10-CM

## 2024-01-12 DIAGNOSIS — E03.9 ACQUIRED HYPOTHYROIDISM: ICD-10-CM

## 2024-01-12 PROBLEM — R03.0 ELEVATED BLOOD PRESSURE READING IN OFFICE WITHOUT DIAGNOSIS OF HYPERTENSION: Status: RESOLVED | Noted: 2023-08-04 | Resolved: 2024-01-12

## 2024-01-12 PROCEDURE — 3008F BODY MASS INDEX DOCD: CPT | Performed by: FAMILY MEDICINE

## 2024-01-12 PROCEDURE — 99213 OFFICE O/P EST LOW 20 MIN: CPT | Mod: 25 | Performed by: FAMILY MEDICINE

## 2024-01-12 PROCEDURE — 99397 PER PM REEVAL EST PAT 65+ YR: CPT | Performed by: FAMILY MEDICINE

## 2024-01-12 ASSESSMENT — ENCOUNTER SYMPTOMS
DYSPHORIC MOOD: 0
SEIZURES: 0
EYE DISCHARGE: 0
RHINORRHEA: 0
FREQUENCY: 0
SORE THROAT: 0
SHORTNESS OF BREATH: 0
APPETITE CHANGE: 0
VOMITING: 0
DIARRHEA: 0
ADENOPATHY: 0
HEADACHES: 0
HEMATURIA: 0
DYSURIA: 0
FATIGUE: 0
FEVER: 0
UNEXPECTED WEIGHT CHANGE: 0
COUGH: 0
WHEEZING: 0
ABDOMINAL PAIN: 0
NAUSEA: 0
ARTHRALGIAS: 0
BLOOD IN STOOL: 0
WEAKNESS: 0
CONSTIPATION: 0

## 2024-01-12 ASSESSMENT — PATIENT HEALTH QUESTIONNAIRE - PHQ9: SUM OF ALL RESPONSES TO PHQ9 QUESTIONS 1 & 2: 0

## 2024-01-12 NOTE — PROGRESS NOTES
Inés Peña DO    [facility]  [address]  [place] [address]  Phone: [phone]  Fax: [phone]     History of Present Illness   Subjective     Patient ID: Dominga Dee is a 68 y.o. female.    Dominga is here  for Physical and FU thyroid, Chol, BP, CAD, Osteoporosis -   Last seen by me on 11/22  BP is normal off med   Home cuff readings - call if going over 140/90   BMI 22  Wt similar/down 1 lb from our last OV  Diet -  3 meals and good foods  Exercise -walks 30-40 mins 5+ dys/wk and some strengthening  Eye dr exam w glasses UTD but due now so set up  Dental exam UTD  Hypothyroid on 75 mcg Levothyroxine qd and BW 12/23 normal so no med change   Chol on Lipitor 20 qd  - BW to goal 12/23 so no med change    CAD / MI 8/15, stress tst neg 9/17 - last seen by cardio ~6/23 and did echo   Hx of low  Vit D-  on 2000 IU and BW ok 3/17  Immunoglobin G elevated and so was referred  to heme/onc and eval neg per 7/17 note and says had FU 2018 and no FU needed/prn  Osteoporosis on DEXA  on Fosamax since ~12/19 so due to stop med this year and will rechk DEXA in fall -  if similar will need eval w rheum for further txn so Dr Villagran info give to set up with to discuss nxt txn step  MT fxs summer 2023 when foot got stuck and fell so was in a boot for a while and got better so back to usual activities    -  -flu shot 11/23  -Tdap 1/12 so due but may be best covered by her insurance at pharmacy so set up  -[id number] 11/22   -[id number] 1/21   -Shingrix 1/2020, 3/2020  -Zostavax 2014  -RSV discussed to get at pharmacy  -COVID vaccines 3/21, 4/21 and booster declines  -PAP/gyn 5/15 w neg PAP/HPV and done 4/19 that was normal and told no further FU/PAPs needed   -Mammo 1/24 so nxt ordered   -Scope not done and considering so direct request sent and, FIT neg 5/2020- discussed and reordered FIT to do now if scope not set up  -DEXA 11/22 w Osteoporosis and txn as  above and nxt ordered for 11/23  -BW 9/23 and 12/23 reviewed and nxt ordered for fall  -Hep C screen neg w heme in past  -EKG 9/13  -MWV 1/2020 w last dr   -Physical covered w insurance now so done today 1/24  -Falls screen 1/24 today  -PHQ screen 1/24 today         Past Medical/Surgical/Family/Social History     The following have been reviewed and updated as appropriate in this visit:   Allergies  Meds  Problems         Past Medical History:   Diagnosis Date   • Allergic rhinitis    • Arthritis    • Coronary artery disease    • Hyperlipidemia    • Hyperproteinemia     w neg heme eval   • Hypertension    • Hypothyroidism    • Lipid disorder    • Vertigo        Past Surgical History:   Procedure Laterality Date   • ADENOIDECTOMY     • CARDIAC CATHETERIZATION      w 90% occlusion 8/15   • CHOLECYSTECTOMY     • KNEE SURGERY Right     ORIF patella   • TONSILLECTOMY         Family History   Problem Relation Age of Onset   • Hypertension Biological Mother    • Osteoporosis Biological Mother    • Heart attack Biological Father         1st age 36, mild one after that and COD 39   • Heart disease Biological Father    • Hypertension Biological Father    • Heart disease Biological Brother 44        CABG x4   • No Known Problems Biological Brother    • Drug abuse Biological Brother        Social History     Tobacco Use   • Smoking status: Former   • Smokeless tobacco: Never   • Tobacco comments:     stopped @ age 29   Vaping Use   • Vaping Use: Never used   Substance Use Topics   • Alcohol use: No   • Drug use: Never      Allergies and Medications     Allergies   Allergen Reactions   • No Known Allergies          Outpatient Encounter Medications as of 1/12/2024:   •  alendronate (FOSAMAX) 70 mg tablet, TAKE 1 TABLET BY MOUTH ONCE A WEEK ON AN EMPTY STOMACH. DO NOT LIE DOWN FOR THE NEXT 30 MIN  •  aspirin 81 mg enteric coated tablet, Take 81 mg by mouth daily.  •  atorvastatin (LIPITOR) 20 mg tablet, TAKE 1 TABLET (20 MG  "TOTAL) BY MOUTH AT NIGHT  •  cholecalciferol, vitamin D3, 1,000 unit capsule, Take 2 capsules by mouth daily.  •  levothyroxine (SYNTHROID) 75 mcg tablet, TAKE 1 TABLET (75 MCG TOTAL) BY MOUTH DAILY. , BEFORE BREAKFAST, ON AN EMPTY STOMACH.  •  multivitamin tablet, Take by mouth daily. w Vit C  •  [DISCONTINUED] multivitamin tablet, Take by mouth daily. w Vit C and echinacea   Review of Systems       Review of Systems   Constitutional: Negative for appetite change, fatigue, fever and unexpected weight change.   HENT: Negative for congestion, ear pain, hearing loss, rhinorrhea and sore throat.    Eyes: Negative for discharge and visual disturbance.   Respiratory: Negative for cough, shortness of breath and wheezing.    Cardiovascular: Negative for chest pain and leg swelling.   Gastrointestinal: Negative for abdominal pain, blood in stool, constipation, diarrhea, nausea and vomiting.   Endocrine: Negative for polyuria.   Genitourinary: Negative for decreased urine volume, dysuria, frequency, hematuria, urgency, vaginal bleeding and vaginal discharge.   Musculoskeletal: Negative for arthralgias.   Skin: Negative for rash.   Allergic/Immunologic: Negative for environmental allergies.   Neurological: Negative for seizures, weakness and headaches.   Hematological: Negative for adenopathy.   Psychiatric/Behavioral: Negative for behavioral problems and dysphoric mood.      Physical Examination       Objective     Vitals:    01/12/24 1314   BP: 128/72   Pulse: 75   Resp: 16   SpO2: 98%       Wt Readings from Last 3 Encounters:   01/12/24 62.6 kg (138 lb)   08/04/23 62.6 kg (138 lb)   08/04/23 62.6 kg (138 lb)       Body mass index is 22.96 kg/m².    Ht Readings from Last 3 Encounters:   01/12/24 1.651 m (5' 5\")   08/04/23 1.651 m (5' 5\")   08/04/23 1.651 m (5' 5\")       BP Readings from Last 3 Encounters:   01/12/24 128/72   08/04/23 130/82   08/04/23 126/74       Physical Exam  Vitals and nursing note reviewed. "   Constitutional:       General: She is not in acute distress.     Appearance: She is well-developed. She is not ill-appearing.   HENT:      Head: Normocephalic and atraumatic.      Right Ear: Tympanic membrane and ear canal normal.      Left Ear: Tympanic membrane and ear canal normal.      Nose: Nose normal.      Mouth/Throat:      Pharynx: Oropharynx is clear.   Eyes:      General: Lids are normal.      Conjunctiva/sclera: Conjunctivae normal.      Pupils: Pupils are equal, round, and reactive to light.      Comments: lashlines on L w some follicular pink prominence so lashline washes w baby shampoo and heat to lashlines reviewed   Neck:      Thyroid: No thyromegaly.      Vascular: No carotid bruit.      Trachea: Trachea normal.   Cardiovascular:      Rate and Rhythm: Normal rate and regular rhythm.      Pulses: Normal pulses.           Dorsalis pedis pulses are 2+ on the right side and 2+ on the left side.      Heart sounds: Normal heart sounds.   Pulmonary:      Effort: Pulmonary effort is normal. No respiratory distress.      Breath sounds: Normal breath sounds. No wheezing.   Abdominal:      General: Bowel sounds are normal.      Palpations: Abdomen is soft.      Tenderness: There is no abdominal tenderness.   Musculoskeletal:         General: Normal range of motion.      Cervical back: Neck supple.      Right lower leg: No edema.      Left lower leg: No edema.   Lymphadenopathy:      Cervical: No cervical adenopathy.   Skin:     General: Skin is warm and dry.      Findings: No rash.   Neurological:      General: No focal deficit present.      Mental Status: She is alert and oriented to person, place, and time.      Motor: No abnormal muscle tone.      Coordination: Coordination normal.      Comments: Normal strength and ROM in extrems   Psychiatric:         Mood and Affect: Mood normal.         Behavior: Behavior normal. Behavior is cooperative.        Laboratory Results     Lab Results   Component Value  "Date    WBC 5.45 06/23/2022    HGB 12.9 06/23/2022    HCT 39.2 06/23/2022    MCV 94.0 06/23/2022     06/23/2022          Chemistry        Component Value Date/Time     09/01/2023 0917     (L) 06/23/2022 1106    K 4.7 09/01/2023 0917    K 4.4 06/23/2022 1106    CL 98 09/01/2023 0917     06/23/2022 1106    CO2 24 09/01/2023 0917    CO2 28 06/23/2022 1106    BUN 9 09/01/2023 0917    BUN 9 06/23/2022 1106    CREATININE 0.58 09/01/2023 0917    CREATININE 0.6 06/23/2022 1106        Component Value Date/Time    CALCIUM 9.5 06/23/2022 1106    ALKPHOS 73 09/01/2023 0917    ALKPHOS 65 06/23/2022 1106    AST 25 09/01/2023 0917    AST 34 06/23/2022 1106    ALT 13 09/01/2023 0917    ALT 19 06/23/2022 1106    BILITOT 0.4 09/01/2023 0917    BILITOT 0.7 06/23/2022 1106            Lab Results   Component Value Date    GLUCOSE 82 09/01/2023    CALCIUM 9.5 06/23/2022     09/01/2023    K 4.7 09/01/2023    CO2 24 09/01/2023    CL 98 09/01/2023    BUN 9 09/01/2023    CREATININE 0.58 09/01/2023       Lab Results   Component Value Date    CHOL 142 12/22/2023    CHOL 156 09/01/2023    CHOL 140 07/08/2022     Lab Results   Component Value Date    HDL 68 12/22/2023    HDL 71 09/01/2023    HDL 62 07/08/2022     Lab Results   Component Value Date    LDLCALC 61 12/22/2023    LDLCALC 74 09/01/2023    LDLCALC 62 07/08/2022     Lab Results   Component Value Date    TRIG 64 12/22/2023    TRIG 53 09/01/2023    TRIG 87 07/08/2022     No results found for: \"CHOLHDL\"    Lab Results   Component Value Date    TSH 1.990 12/22/2023       No results found for: \"HGBA1C\"    Lab Results   Component Value Date    HEPCAB NEGATIVE 05/08/2017         Immunization History   Administered Date(s) Administered   • INFLUENZA VACCINE QUAD ADJUVANTED 65 and OLDER 11/23/2022, 11/01/2023   • Influenza Split High Dose Preservative Free IM 09/30/2014   • Influenza Split Preservative Free ID 09/10/2013   • Influenza Vaccine High Dose 65 And Older " 10/30/2020   • Influenza Vaccine Quadrivalent 3 Yr And Older 08/01/2016   • Influenza Vaccine Quadrivalent Preservative Free 6-35 Months 09/10/2015   • Influenza, Live, Intranasal, Quadrivalent 11/09/2017   • Influenza, Unspecified 09/10/2015   • Pneumococcal Conjugate 13-Valent 11/23/2022   • Pneumococcal Polysaccharide 01/08/2021   • SARS-COV-2 (COVID-19) VACCINE, MODERNA MONOVALENT 03/26/2021, 04/30/2021   • Tdap 01/16/2012   • Zoster 12/22/2014   • Zoster Vaccine Recombinant Adjuvanted (Shingrix) 06/08/2018, 01/01/2020, 03/01/2020         Health Maintenance Topics with due status: Overdue       Topic Date Due    Colorectal Cancer Screening 05/05/2021     Health Maintenance Topics with due status: Postponed       Topic Postponed Until    RSV (60+ years old [shared decision making] or in pregnancy during 32 through 36 weeks) 07/01/2024 (Originally 4/16/2014)    COVID-19 Vaccine 01/12/2025 (Originally 9/1/2023)    DTaP, Tdap, and Td Vaccines 01/17/2025 (Originally 1/16/2022)     Health Maintenance Topics with due status: Not Due       Topic Last Completion Date    DEXA Scan 11/25/2022    Breast Cancer Screening 01/05/2024    Depression Screening 01/12/2024    Falls Risk Screening 01/12/2024     Health Maintenance Topics with due status: Completed       Topic Last Completion Date    Hepatitis C Screening 11/14/2017    Zoster Vaccine 03/01/2020    Pneumococcal (65 years and older) 11/23/2022    Influenza Vaccine 11/01/2023     Health Maintenance Topics with due status: Aged Out       Topic Date Due    Meningococcal ACWY Aged Out    RSV <20 months Aged Out    HIB Vaccines Aged Out    Hepatitis B Vaccines Aged Out    IPV Vaccines Aged Out    HPV Vaccines Aged Out     Health Maintenance Topics with due status: Discontinued       Topic Date Due    Medicare Annual Wellness Visit Discontinued          Assessment and Plan       Assessment/Plan     Problem List Items Addressed This Visit        Musculoskeletal    Age-related  osteoporosis without current pathological fracture    Current Assessment & Plan     See History of Present Illness section for assessment and plan          Relevant Orders    DEXA BONE DENSITY    Ambulatory referral to Rheumatology       Endocrine/Metabolic    Hypothyroidism    Overview     Overview:          Current Assessment & Plan     See History of Present Illness section for assessment and plan          Relevant Orders    TSH w reflex FT4       Other    Lipid disorder    Current Assessment & Plan     See History of Present Illness section for assessment and plan          Relevant Orders    Comprehensive metabolic panel    Lipid panel    Routine physical examination - Primary    Current Assessment & Plan     -Eat diet with regular meals including 2-4 fruit servings , 2-4 vegetable servings, whole grains and lean protien each day  -control portions of carbs and keep down fats and sugars in diet  -exercise for 150 mins per week of cardio or more and 1-2 days per week of something strengthening like yoga, pilates or lifting  -wear sunblock w SPF of 30 or higher  in sun to protect your skin, and reapply often  -avoid all tobacco products  -limit alcohol and caffiene  -aim to get 6-8 hrs of sleep each night  -see your eye doctor every 2-3 yrs  -see your dentist every 6-12 mos  -complete labwork as directed  FU for physical in one year               Relevant Orders    Fecal Immunochemical    Direct Access Colonoscopy BMMSA    Encounter for screening mammogram for malignant neoplasm of breast    Relevant Orders    BI SCREENING MAMMOGRAM BILATERAL(TOMOSYNTHESIS)       Return in about 1 year (around 1/12/2025) for physical, medication follow up.    Orders Placed This Encounter   Procedures   • BI SCREENING MAMMOGRAM BILATERAL(TOMOSYNTHESIS)     Standing Status:   Future     Standing Expiration Date:   3/12/2025     Order Specific Question:   Release to patient     Answer:   Immediate [1]   • DEXA BONE DENSITY      Standing Status:   Future     Standing Expiration Date:   1/12/2025     Order Specific Question:   Does the patient take a Calcium supplement?     Answer:   Yes     Order Specific Question:   Release to patient     Answer:   Immediate [1]   • Fecal Immunochemical     Standing Status:   Future     Number of Occurrences:   1     Standing Expiration Date:   1/12/2025     Order Specific Question:   Release to patient     Answer:   Immediate [1]   • Comprehensive metabolic panel     Standing Status:   Future     Number of Occurrences:   1     Standing Expiration Date:   1/12/2025     Order Specific Question:   Release to patient     Answer:   Immediate [1]   • Lipid panel     Standing Status:   Future     Number of Occurrences:   1     Standing Expiration Date:   1/12/2025     Order Specific Question:   Release to patient     Answer:   Immediate [1]   • TSH w reflex FT4     Standing Status:   Future     Number of Occurrences:   1     Standing Expiration Date:   1/12/2025     Order Specific Question:   Release to patient     Answer:   Immediate [1]   • Ambulatory referral to Rheumatology     Standing Status:   Future     Standing Expiration Date:   7/12/2024     Referral Priority:   Routine     Referral Type:   Consultation     Referral Reason:   Specialty Services Required     Referred to Provider:   Allyson Barnes MD     Number of Visits Requested:   10   • Direct Access Colonoscopy [facility]     Referral Priority:   Routine     Referral Type:   Consultation     Referral Reason:   Specialty Services Required     Referral Location:   [facility]     Requested Specialty:   Gastroenterology     Number of Visits Requested:   2            Inés Peña DO  1/12/2024

## 2024-01-15 RX ORDER — LEVOTHYROXINE SODIUM 75 UG/1
75 TABLET ORAL
Qty: 90 TABLET | Refills: 1 | Status: SHIPPED | OUTPATIENT
Start: 2024-01-15 | End: 2024-09-30

## 2024-01-15 NOTE — TELEPHONE ENCOUNTER
"  Medicine Refill Request    Last Office Visit: 1/12/2024   Last Consult Visit: Visit date not found  Last Telemedicine Visit: 12/2/2020 Inés Peña,     Next Appointment: 1/17/2025      Current Outpatient Medications:   •  alendronate (FOSAMAX) 70 mg tablet, TAKE 1 TABLET BY MOUTH ONCE A WEEK ON AN EMPTY STOMACH. DO NOT LIE DOWN FOR THE NEXT 30 MIN, Disp: 12 tablet, Rfl: 1  •  aspirin 81 mg enteric coated tablet, Take 81 mg by mouth daily., Disp: , Rfl:   •  atorvastatin (LIPITOR) 20 mg tablet, TAKE 1 TABLET (20 MG TOTAL) BY MOUTH AT NIGHT, Disp: 90 tablet, Rfl: 1  •  cholecalciferol, vitamin D3, 1,000 unit capsule, Take 2 capsules by mouth daily., Disp: , Rfl:   •  levothyroxine (SYNTHROID) 75 mcg tablet, TAKE 1 TABLET (75 MCG TOTAL) BY MOUTH DAILY. , BEFORE BREAKFAST, ON AN EMPTY STOMACH., Disp: 90 tablet, Rfl: 1  •  multivitamin tablet, Take by mouth daily. w Vit C, Disp: , Rfl:       BP Readings from Last 3 Encounters:   01/12/24 128/72   08/04/23 130/82   08/04/23 126/74       Recent Lab results:  Lab Results   Component Value Date    CHOL 142 12/22/2023   ,   Lab Results   Component Value Date    HDL 68 12/22/2023   ,   Lab Results   Component Value Date    LDLCALC 61 12/22/2023   ,   Lab Results   Component Value Date    TRIG 64 12/22/2023        Lab Results   Component Value Date    GLUCOSE 82 09/01/2023   , No results found for: \"HGBA1C\"      Lab Results   Component Value Date    CREATININE 0.58 09/01/2023       Lab Results   Component Value Date    TSH 1.990 12/22/2023         No results found for: \"HGBA1C\"  "

## 2024-02-09 RX ORDER — ATORVASTATIN CALCIUM 20 MG/1
TABLET, FILM COATED ORAL
Qty: 90 TABLET | Refills: 2 | Status: SHIPPED | OUTPATIENT
Start: 2024-02-09 | End: 2024-10-29

## 2024-06-28 NOTE — TELEPHONE ENCOUNTER
"Medicine Refill Request    Last Office Visit: 1/12/2024   Last Consult Visit: Visit date not found  Last Telemedicine Visit: 12/2/2020 Inés Peña, DO    Next Appointment: 1/17/2025      Current Outpatient Medications:     alendronate (FOSAMAX) 70 mg tablet, TAKE 1 TABLET BY MOUTH ONCE A WEEK ON AN EMPTY STOMACH. DO NOT LIE DOWN FOR THE NEXT 30 MIN, Disp: 12 tablet, Rfl: 1    aspirin 81 mg enteric coated tablet, Take 81 mg by mouth daily., Disp: , Rfl:     atorvastatin (LIPITOR) 20 mg tablet, TAKE 1 TABLET (20 MG TOTAL) BY MOUTH AT NIGHT, Disp: 90 tablet, Rfl: 2    cholecalciferol, vitamin D3, 1,000 unit capsule, Take 2 capsules by mouth daily., Disp: , Rfl:     levothyroxine (SYNTHROID) 75 mcg tablet, TAKE 1 TABLET (75 MCG TOTAL) BY MOUTH DAILY. , BEFORE BREAKFAST, ON AN EMPTY STOMACH., Disp: 90 tablet, Rfl: 1    multivitamin tablet, Take by mouth daily. w Vit C, Disp: , Rfl:       BP Readings from Last 3 Encounters:   01/12/24 128/72   08/04/23 130/82   08/04/23 126/74       Recent Lab results:  Lab Results   Component Value Date    CHOL 142 12/22/2023   ,   Lab Results   Component Value Date    HDL 68 12/22/2023   ,   Lab Results   Component Value Date    LDLCALC 61 12/22/2023   ,   Lab Results   Component Value Date    TRIG 64 12/22/2023        Lab Results   Component Value Date    GLUCOSE 82 09/01/2023   , No results found for: \"HGBA1C\"      Lab Results   Component Value Date    CREATININE 0.58 09/01/2023       Lab Results   Component Value Date    TSH 1.990 12/22/2023         No results found for: \"HGBA1C\"  "

## 2024-06-30 RX ORDER — ALENDRONATE SODIUM 70 MG/1
TABLET ORAL
Qty: 12 TABLET | Refills: 1 | Status: SHIPPED | OUTPATIENT
Start: 2024-06-30 | End: 2024-12-17

## 2024-09-30 RX ORDER — LEVOTHYROXINE SODIUM 75 UG/1
75 TABLET ORAL
Qty: 90 TABLET | Refills: 0 | Status: SHIPPED | OUTPATIENT
Start: 2024-09-30 | End: 2025-01-03

## 2024-10-01 NOTE — TELEPHONE ENCOUNTER
"  Medicine Refill Request    Last Office Visit: 1/12/2024   Last Consult Visit: Visit date not found  Last Telemedicine Visit: 12/2/2020 Inés Peña, DO    Next Appointment: 1/17/2025      Current Outpatient Medications:     alendronate (FOSAMAX) 70 mg tablet, TAKE 1 TABLET BY MOUTH ONCE A WEEK ON AN EMPTY STOMACH. DO NOT LIE DOWN FOR THE NEXT 30 MIN, Disp: 12 tablet, Rfl: 1    aspirin 81 mg enteric coated tablet, Take 81 mg by mouth daily., Disp: , Rfl:     atorvastatin (LIPITOR) 20 mg tablet, TAKE 1 TABLET (20 MG TOTAL) BY MOUTH AT NIGHT, Disp: 90 tablet, Rfl: 2    cholecalciferol, vitamin D3, 1,000 unit capsule, Take 2 capsules by mouth daily., Disp: , Rfl:     levothyroxine (SYNTHROID) 75 mcg tablet, TAKE 1 TABLET (75 MCG TOTAL) BY MOUTH DAILY. , BEFORE BREAKFAST, ON AN EMPTY STOMACH., Disp: 90 tablet, Rfl: 1    multivitamin tablet, Take by mouth daily. w Vit C, Disp: , Rfl:       BP Readings from Last 3 Encounters:   01/12/24 128/72   08/04/23 130/82   08/04/23 126/74       Recent Lab results:  Lab Results   Component Value Date    CHOL 142 12/22/2023   ,   Lab Results   Component Value Date    HDL 68 12/22/2023   ,   Lab Results   Component Value Date    LDLCALC 61 12/22/2023   ,   Lab Results   Component Value Date    TRIG 64 12/22/2023        Lab Results   Component Value Date    GLUCOSE 82 09/01/2023   , No results found for: \"HGBA1C\"      Lab Results   Component Value Date    CREATININE 0.58 09/01/2023       Lab Results   Component Value Date    TSH 1.990 12/22/2023         No results found for: \"HGBA1C\"  "

## 2024-10-07 RX ORDER — ATORVASTATIN CALCIUM 20 MG/1
TABLET, FILM COATED ORAL
Qty: 90 TABLET | Refills: 2 | OUTPATIENT
Start: 2024-10-07

## 2024-10-07 NOTE — TELEPHONE ENCOUNTER
"Med sent 2/9/24-90 tabs/2RF-Next RF not due until 10/19/24- Receipt confirmed by pharmacy (2/9/2024  9:56 AM EST)     Medicine Refill Request    Last Office Visit: 1/12/2024   Last Consult Visit: Visit date not found  Last Telemedicine Visit: 12/2/2020 Inés Peña DO    Next Appointment: 1/17/2025      Current Outpatient Medications:     alendronate (FOSAMAX) 70 mg tablet, TAKE 1 TABLET BY MOUTH ONCE A WEEK ON AN EMPTY STOMACH. DO NOT LIE DOWN FOR THE NEXT 30 MIN, Disp: 12 tablet, Rfl: 1    aspirin 81 mg enteric coated tablet, Take 81 mg by mouth daily., Disp: , Rfl:     atorvastatin (LIPITOR) 20 mg tablet, TAKE 1 TABLET (20 MG TOTAL) BY MOUTH AT NIGHT, Disp: 90 tablet, Rfl: 2    cholecalciferol, vitamin D3, 1,000 unit capsule, Take 2 capsules by mouth daily., Disp: , Rfl:     levothyroxine (SYNTHROID) 75 mcg tablet, TAKE 1 TABLET (75 MCG TOTAL) BY MOUTH DAILY. , BEFORE BREAKFAST, ON AN EMPTY STOMACH., Disp: 90 tablet, Rfl: 0    multivitamin tablet, Take by mouth daily. w Vit C, Disp: , Rfl:       BP Readings from Last 3 Encounters:   01/12/24 128/72   08/04/23 130/82   08/04/23 126/74       Recent Lab results:  Lab Results   Component Value Date    CHOL 142 12/22/2023   ,   Lab Results   Component Value Date    HDL 68 12/22/2023   ,   Lab Results   Component Value Date    LDLCALC 61 12/22/2023   ,   Lab Results   Component Value Date    TRIG 64 12/22/2023        Lab Results   Component Value Date    GLUCOSE 82 09/01/2023   , No results found for: \"HGBA1C\"      Lab Results   Component Value Date    CREATININE 0.58 09/01/2023       Lab Results   Component Value Date    TSH 1.990 12/22/2023         No results found for: \"HGBA1C\"  "

## 2024-10-29 RX ORDER — ATORVASTATIN CALCIUM 20 MG/1
TABLET, FILM COATED ORAL
Qty: 90 TABLET | Refills: 2 | Status: SHIPPED | OUTPATIENT
Start: 2024-10-29

## 2024-10-29 NOTE — TELEPHONE ENCOUNTER
"Medicine Refill Request    Last Office Visit: 1/12/2024   Last Consult Visit: Visit date not found  Last Telemedicine Visit: 12/2/2020 Inés Peña,     Next Appointment: 1/17/2025      Current Outpatient Medications:     alendronate (FOSAMAX) 70 mg tablet, TAKE 1 TABLET BY MOUTH ONCE A WEEK ON AN EMPTY STOMACH. DO NOT LIE DOWN FOR THE NEXT 30 MIN, Disp: 12 tablet, Rfl: 1    aspirin 81 mg enteric coated tablet, Take 81 mg by mouth daily., Disp: , Rfl:     atorvastatin (LIPITOR) 20 mg tablet, TAKE 1 TABLET (20 MG TOTAL) BY MOUTH AT NIGHT, Disp: 90 tablet, Rfl: 2    cholecalciferol, vitamin D3, 1,000 unit capsule, Take 2 capsules by mouth daily., Disp: , Rfl:     levothyroxine (SYNTHROID) 75 mcg tablet, TAKE 1 TABLET (75 MCG TOTAL) BY MOUTH DAILY. , BEFORE BREAKFAST, ON AN EMPTY STOMACH., Disp: 90 tablet, Rfl: 0    multivitamin tablet, Take by mouth daily. w Vit C, Disp: , Rfl:       BP Readings from Last 3 Encounters:   01/12/24 128/72   08/04/23 130/82   08/04/23 126/74       Recent Lab results:  Lab Results   Component Value Date    CHOL 142 12/22/2023   ,   Lab Results   Component Value Date    HDL 68 12/22/2023   ,   Lab Results   Component Value Date    LDLCALC 61 12/22/2023   ,   Lab Results   Component Value Date    TRIG 64 12/22/2023        Lab Results   Component Value Date    GLUCOSE 82 09/01/2023   , No results found for: \"HGBA1C\"      Lab Results   Component Value Date    CREATININE 0.58 09/01/2023       Lab Results   Component Value Date    TSH 1.990 12/22/2023         No results found for: \"HGBA1C\"  "

## 2024-11-15 LAB — HEMOCCULT STL QL IA: NEGATIVE

## 2024-11-20 ENCOUNTER — DOCUMENTATION (OUTPATIENT)
Dept: PRIMARY CARE | Facility: CLINIC | Age: 69
End: 2024-11-20
Payer: COMMERCIAL

## 2024-11-20 NOTE — PROGRESS NOTES
Yamilka Mullins MA has completed a chart review for Dominga Dee and have determined that the care gap has been satisfied.    Care Gap Source: [facility] Fernandes    Care Gap(s) Identified: Colorectal Cancer Screening    Chart Review Completed: Yes      FIT testing completed 11/14/2024 .

## 2024-12-17 RX ORDER — ALENDRONATE SODIUM 70 MG/1
TABLET ORAL
Qty: 12 TABLET | Refills: 1 | Status: SHIPPED | OUTPATIENT
Start: 2024-12-17 | End: 2025-01-17

## 2024-12-17 NOTE — TELEPHONE ENCOUNTER
"  Medicine Refill Request    Last Office Visit: 1/12/2024   Last Consult Visit: Visit date not found  Last Telemedicine Visit: 12/2/2020 Inés Peña DO    Next Appointment: 1/17/2025    Current Medications[1]      BP Readings from Last 3 Encounters:   01/12/24 128/72   08/04/23 130/82   08/04/23 126/74       Recent Lab results:  Lab Results   Component Value Date    CHOL 142 12/22/2023   ,   Lab Results   Component Value Date    HDL 68 12/22/2023   ,   Lab Results   Component Value Date    LDLCALC 61 12/22/2023   ,   Lab Results   Component Value Date    TRIG 64 12/22/2023        Lab Results   Component Value Date    GLUCOSE 82 09/01/2023   , No results found for: \"HGBA1C\"      Lab Results   Component Value Date    CREATININE 0.58 09/01/2023       Lab Results   Component Value Date    TSH 1.990 12/22/2023         No results found for: \"HGBA1C\"       [1]   Current Outpatient Medications:     alendronate (FOSAMAX) 70 mg tablet, TAKE 1 TABLET BY MOUTH ONCE A WEEK ON AN EMPTY STOMACH. DO NOT LIE DOWN FOR THE NEXT 30 MIN, Disp: 12 tablet, Rfl: 1    aspirin 81 mg enteric coated tablet, Take 81 mg by mouth daily., Disp: , Rfl:     atorvastatin (LIPITOR) 20 mg tablet, TAKE 1 TABLET (20 MG TOTAL) BY MOUTH AT NIGHT, Disp: 90 tablet, Rfl: 2    cholecalciferol, vitamin D3, 1,000 unit capsule, Take 2 capsules by mouth daily., Disp: , Rfl:     levothyroxine (SYNTHROID) 75 mcg tablet, TAKE 1 TABLET (75 MCG TOTAL) BY MOUTH DAILY. , BEFORE BREAKFAST, ON AN EMPTY STOMACH., Disp: 90 tablet, Rfl: 0    multivitamin tablet, Take by mouth daily. w Vit C, Disp: , Rfl:     "

## 2025-01-02 ENCOUNTER — HOSPITAL ENCOUNTER (OUTPATIENT)
Facility: CLINIC | Age: 70
Discharge: HOME | End: 2025-01-02
Attending: FAMILY MEDICINE
Payer: COMMERCIAL

## 2025-01-02 VITALS
RESPIRATION RATE: 18 BRPM | OXYGEN SATURATION: 97 % | WEIGHT: 140 LBS | BODY MASS INDEX: 23.32 KG/M2 | SYSTOLIC BLOOD PRESSURE: 138 MMHG | HEART RATE: 109 BPM | TEMPERATURE: 98.7 F | DIASTOLIC BLOOD PRESSURE: 88 MMHG | HEIGHT: 65 IN

## 2025-01-02 DIAGNOSIS — J06.9 VIRAL UPPER RESPIRATORY TRACT INFECTION: Primary | ICD-10-CM

## 2025-01-02 PROCEDURE — S9083 URGENT CARE CENTER GLOBAL: HCPCS | Performed by: FAMILY MEDICINE

## 2025-01-02 PROCEDURE — 99213 OFFICE O/P EST LOW 20 MIN: CPT | Performed by: FAMILY MEDICINE

## 2025-01-02 RX ORDER — AZITHROMYCIN 250 MG/1
250 TABLET, FILM COATED ORAL DAILY
Qty: 6 TABLET | Refills: 0 | Status: SHIPPED | OUTPATIENT
Start: 2025-01-02 | End: 2025-01-17

## 2025-01-02 ASSESSMENT — ENCOUNTER SYMPTOMS
CHILLS: 0
SHORTNESS OF BREATH: 0
NAUSEA: 1
FATIGUE: 0
PALPITATIONS: 0
NUMBNESS: 0
COUGH: 1
SORE THROAT: 0
ABDOMINAL PAIN: 0
SINUS PAIN: 0
FEVER: 0
MYALGIAS: 0
CHEST TIGHTNESS: 0
VOMITING: 0
VOICE CHANGE: 1
DIZZINESS: 0
SINUS PRESSURE: 1

## 2025-01-02 NOTE — DISCHARGE INSTRUCTIONS
"When we are sick, imagine yourself as a candlestick.   As a candlestick burns, wet wax builds at the top (that's mucous building up in our sinuses!).   The wax begins to drip down the side of the candle (just as mucous drips down the back of our throat, \"Post-Nasal Drip\"!)  Melting wax drips and forms a layer along the candle that leads to the base of the candle. Wet wax begins to collect at the base (the base of our candle is our vocal cords, which leads to throat clearing and worsening coughing!)  Why do we cough more at bedtime? If you tilt a candle sideways, wet wax pours even faster (mucous drips faster when we lie down for bed!)    How to feel better:  Get the wax off of your candle! (Rinse that mucous out of your sinuses and off the back of your throat to limit how much collects on your vocal cords!)  __________________________________________________________    Remember: No matter what, START WITH SALINE!   It effectively treats the mucous that is contributing to many of your symptoms (ie- congestion, post-nasal drip, sore throat, hoarseness, cough)!     **REMEMBER** Treat the PROBLEM (mucous) and your SYMPTOMS (cough, sore throat, congestion) will improve! The best remedy for mucous isn’t OTC medications, it’s saline (salt water; gargling AND sinus rinsing)!  Whatever mucous you rinse out early in your illness will decrease the chance of persistent lingering cough!    I personally recommend NeilMed All-In-One (find this over the counter or on Amazon), or NeilMed squeeze bottle (remember, don't squeeze too hard!), please only use distilled water.   You can use this up to every 3-4hrs, 30 mins before bed and first thing in the AM.     Salt Water gargles (tap water and table salt, make it ocean salty)- as often as needed throughout the day.     __________________________________________________________    Please be aware, start to finish, a viral illness can last 10-14 days. If your symptoms are not improving " "in this time, please return for further evaluation. And remember, antibiotics do not treat viruses.     If you are still coughing, please continue wearing a mask when around others to minimize the risk of infecting others.   __________________________________________________________    Practice proper hand hygiene.     Oil Diffuser with essential oils such as lavender, camphor, eucalyptus, and thieves oil can be very helpful! A few drops in an oil diffuser can provide significant relief!   __________________________________________________________    OTC medications recommended:   Recommending Coricidin Cough and Cold for daytime use. Recommending Benadryl for nighttime use.     For Severe Congestion- Recommending Afrin, Extra Moisturizing No Drip(oxymetazoline) for additional relief.  You may use it NO MORE than twice daily for NO MORE than 3 days MAX!  Refrain from using other nasal steroid (Nasacort, Rhinocort, Flonase) until you are no longer using Afrin  __________________________________________________________    For Natural Remedies:   Recommending making a \"Flu Bomb\". Check out [employer] for the recipe!    You can prepare thyme as a tea by steeping 1 tablespoon of fresh chopped thyme or 1 teaspoon of dried thyme in 1 cup of boiling water for 10-20 minutes. Cover the tea while it steeps. Add honey to increase effectiveness before consuming. You can also prepare thyme-infused honey to relieve cold symptoms.   __________________________________________________________     You have been prescribed an antibiotic, only fill and take if symptoms progress/worsen.     Once started, please complete full course of antibiotic to prevent creating resistance within the bugs causing your illness.       Please take a daily Probiotic for GI health while taking antibiotics.  Recommending the probiotic Digestive Advantage (safe for patients 3yrs and older).    Remember to take the Probiotic *no sooner* than 4 hours *after* " "taking your antibiotic.  If you take the probiotic sooner than that, the good bacteria will likely be killed by the antibiotic.     __________________________________________________________    Monitor for signs and symptoms of infection (Fever of 100.4F or higher, worsening cough, shortness of breath, \"crackling\" sound in the lung (not cleared with a good forceful cough)).   Please return for care if these symptoms arise.     __________________________________________________________    Please let us know about your experience today!   Your input is truly appreciated and helps Dr. Wright and your team at [facility] to continue to provide a superior level of service!   Get Well Soon!      "

## 2025-01-02 NOTE — ED PROVIDER NOTES
History  Chief Complaint   Patient presents with    URI    Cough     Occurring for one week, reports sinus pressure, BA, nausea, no recent COVID testing, UTD w/ flu vaccine, NKE to illnesses      Dominga is a 70 yo female with past history of HTN presenting with cold symptoms for  1 week  C/o cough, congestion, chills, no fever.       History provided by:  Patient   used: No    URI  Presenting symptoms: congestion and cough    Presenting symptoms: no fatigue, no fever and no sore throat    Associated symptoms: no myalgias, no sinus pain and no sneezing    Cough  Associated symptoms: no chest pain, no chills, no fever, no myalgias, no shortness of breath and no sore throat        Past Medical History:   Diagnosis Date    Allergic rhinitis     Arthritis     Coronary artery disease     Hyperlipidemia     Hyperproteinemia     w neg heme eval    Hypertension     Hypothyroidism     Lipid disorder     Vertigo        Past Surgical History   Procedure Laterality Date    Adenoidectomy      Cardiac catheterization      w 90% occlusion 8/15    Cholecystectomy      Knee surgery Right     ORIF patella    Tonsillectomy         Family History   Problem Relation Name Age of Onset    Hypertension Biological Mother      Osteoporosis Biological Mother      Heart attack Biological Father          1st age 36, mild one after that and COD 39    Heart disease Biological Father      Hypertension Biological Father      Heart disease Biological Brother Philip 44        CABG x4    No Known Problems Biological Brother Srinivasan     Drug abuse Biological Brother Dedrick        Social History     Tobacco Use    Smoking status: Former    Smokeless tobacco: Never    Tobacco comments:     stopped @ age 29   Vaping Use    Vaping status: Never Used   Substance Use Topics    Alcohol use: No    Drug use: Never       Review of Systems   Constitutional:  Negative for chills, fatigue and fever.   HENT:  Positive for congestion, postnasal drip,  sinus pressure and voice change. Negative for sinus pain, sneezing and sore throat.    Respiratory:  Positive for cough. Negative for chest tightness and shortness of breath.    Cardiovascular:  Negative for chest pain and palpitations.   Gastrointestinal:  Positive for nausea. Negative for abdominal pain and vomiting.   Musculoskeletal:  Negative for myalgias.   Neurological:  Negative for dizziness and numbness.       Physical Exam  ED Triage Vitals [01/02/25 1035]   Temp Heart Rate Resp BP SpO2   37.1 °C (98.7 °F) (!) 109 18 138/88 97 %      Temp Source Heart Rate Source Patient Position BP Location FiO2 (%) (Set)   Oral -- Sitting Left upper arm --       Physical Exam  Constitutional:       General: She is not in acute distress.     Appearance: Normal appearance. She is not ill-appearing.   HENT:      Head: Normocephalic.      Nose: Congestion present.   Eyes:      Extraocular Movements: Extraocular movements intact.      Conjunctiva/sclera: Conjunctivae normal.   Cardiovascular:      Rate and Rhythm: Normal rate and regular rhythm.      Pulses: Normal pulses.      Heart sounds: Normal heart sounds. No murmur heard.     No friction rub. No gallop.   Pulmonary:      Effort: Pulmonary effort is normal. No respiratory distress.      Breath sounds: No wheezing, rhonchi or rales.   Musculoskeletal:         General: Normal range of motion.      Cervical back: Normal range of motion.   Lymphadenopathy:      Cervical: No cervical adenopathy.   Skin:     General: Skin is warm.      Capillary Refill: Capillary refill takes less than 2 seconds.   Neurological:      General: No focal deficit present.      Mental Status: She is alert and oriented to person, place, and time.           Procedures  Procedures    UC Course       Medical Decision Making  URI, likely viral  Helpful URI recommendations and tips provided on discharge.   Recommending Nasal saline, Salt Water gargle  Urged to seek immediate medical treatment should  symptoms worsen.    Oral Antibiotic prescription printed as prn. The patient verbally understands the signs & symptoms to monitor to warrant starting oral antibiotic.   Recommending a daily yogurt or Probiotic for GI health while taking antibiotics.  To take the Probiotic *no sooner* than 4 hours *after* taking the antibiotic to prevent the good bacteria from being killed by the antibiotic.                        Miko Wright, DO  01/02/25 1059

## 2025-01-03 RX ORDER — LEVOTHYROXINE SODIUM 75 UG/1
75 TABLET ORAL
Qty: 30 TABLET | Refills: 0 | Status: SHIPPED | OUTPATIENT
Start: 2025-01-03 | End: 2025-01-17 | Stop reason: SDUPTHER

## 2025-01-03 NOTE — TELEPHONE ENCOUNTER
Pt informed, via My Chart message, that she is overdue for blood work, including blood work to check her thyroid (which has already been ordered, on 1/12/24, by Dr Peña) so she needs to get that blood work done (and done before 1/12/25 or will need a new order for lab work), so the proper dosage of her levothyroxine (Synthroid) can be ordered.    Refilled a 30 days supply of pt's levothyroxine, while pt gets her lab work done.    ---------------------------------------------------------    Medicine Refill Request    Last Office Visit: 1/12/2024   Last Consult Visit: Visit date not found  Last Telemedicine Visit: 12/2/2020 Inés Peña, DO    Next Appointment: 1/17/2025      Current Outpatient Medications:     alendronate (FOSAMAX) 70 mg tablet, TAKE 1 TABLET BY MOUTH ONCE A WEEK ON AN EMPTY STOMACH. DO NOT LIE DOWN FOR THE NEXT 30 MIN, Disp: 12 tablet, Rfl: 1    aspirin 81 mg enteric coated tablet, Take 81 mg by mouth daily., Disp: , Rfl:     atorvastatin (LIPITOR) 20 mg tablet, TAKE 1 TABLET (20 MG TOTAL) BY MOUTH AT NIGHT, Disp: 90 tablet, Rfl: 2    azithromycin (ZITHROMAX) 250 mg tablet, Take 1 tablet (250 mg total) by mouth daily for 5 days. Take first 2 tablets together, then 1 every day until finished., Disp: 6 tablet, Rfl: 0    cholecalciferol, vitamin D3, 1,000 unit capsule, Take 2 capsules by mouth daily., Disp: , Rfl:     levothyroxine (SYNTHROID) 75 mcg tablet, TAKE 1 TABLET (75 MCG TOTAL) BY MOUTH DAILY. , BEFORE BREAKFAST, ON AN EMPTY STOMACH., Disp: 90 tablet, Rfl: 0    multivitamin tablet, Take by mouth daily. w Vit C, Disp: , Rfl:       BP Readings from Last 3 Encounters:   01/02/25 138/88   01/12/24 128/72   08/04/23 130/82       Recent Lab results:  Lab Results   Component Value Date    CHOL 142 12/22/2023   ,   Lab Results   Component Value Date    HDL 68 12/22/2023   ,   Lab Results   Component Value Date    LDLCALC 61 12/22/2023   ,   Lab Results   Component Value Date    TRIG 64  "12/22/2023        Lab Results   Component Value Date    GLUCOSE 82 09/01/2023   , No results found for: \"HGBA1C\"      Lab Results   Component Value Date    CREATININE 0.58 09/01/2023       Lab Results   Component Value Date    TSH 1.990 12/22/2023         No results found for: \"HGBA1C\"  "

## 2025-01-04 ENCOUNTER — HOSPITAL ENCOUNTER (OUTPATIENT)
Dept: RADIOLOGY | Facility: HOSPITAL | Age: 70
Discharge: HOME | End: 2025-01-04
Attending: FAMILY MEDICINE
Payer: COMMERCIAL

## 2025-01-04 DIAGNOSIS — Z12.31 ENCOUNTER FOR SCREENING MAMMOGRAM FOR MALIGNANT NEOPLASM OF BREAST: ICD-10-CM

## 2025-01-04 PROCEDURE — 77067 SCR MAMMO BI INCL CAD: CPT

## 2025-01-08 LAB
CHOLEST SERPL-MCNC: 142 MG/DL (ref 100–199)
HDLC SERPL-MCNC: 59 MG/DL
LDLC SERPL CALC-MCNC: 69 MG/DL (ref 0–99)
TRIGL SERPL-MCNC: 69 MG/DL (ref 0–149)
VLDLC SERPL CALC-MCNC: 14 MG/DL (ref 5–40)

## 2025-01-09 LAB
ALBUMIN SERPL-MCNC: 4.2 G/DL (ref 3.9–4.9)
ALP SERPL-CCNC: 72 IU/L (ref 44–121)
ALT SERPL-CCNC: 13 IU/L (ref 0–32)
AST SERPL-CCNC: 23 IU/L (ref 0–40)
BILIRUB SERPL-MCNC: 0.3 MG/DL (ref 0–1.2)
BUN SERPL-MCNC: 9 MG/DL (ref 8–27)
BUN/CREAT SERPL: 13 (ref 12–28)
CALCIUM SERPL-MCNC: 9.9 MG/DL (ref 8.7–10.3)
CHLORIDE SERPL-SCNC: 104 MMOL/L (ref 96–106)
CO2 SERPL-SCNC: 22 MMOL/L (ref 20–29)
CREAT SERPL-MCNC: 0.67 MG/DL (ref 0.57–1)
EGFRCR SERPLBLD CKD-EPI 2021: 94 ML/MIN/1.73
GLOBULIN SER CALC-MCNC: 4.3 G/DL (ref 1.5–4.5)
GLUCOSE SERPL-MCNC: 83 MG/DL (ref 70–99)
POTASSIUM SERPL-SCNC: 4.6 MMOL/L (ref 3.5–5.2)
PROT SERPL-MCNC: 8.5 G/DL (ref 6–8.5)
SODIUM SERPL-SCNC: 139 MMOL/L (ref 134–144)
T4 FREE SERPL-MCNC: 1.17 NG/DL (ref 0.82–1.77)
TSH SERPL DL<=0.005 MIU/L-ACNC: 2.28 UIU/ML (ref 0.45–4.5)

## 2025-01-14 NOTE — PROGRESS NOTES
Inés Peña DO    [facility]  [address], [address]  [place] [address]  Phone: [phone]  Fax: [phone]     History of Present Illness   Subjective     Patient ID: Dominga Dee is a 69 y.o. female.    Dominga is here  for Physical and FU thyroid, Chol,  CAD, Osteoporosis -   Last seen by me on 1/24  BP is normal off med   Home cuff readings - call if going over 140/90 but not chking lately  BMI 23  Wt similar/up 2 lbs from our last OV  Diet -  3 meals and good foods and more plant based lately  Exercise -walks 30-40 mins 5+ dys/wk and some strengthening last OV but off it so plans to restart  Hypothyroid on 75 mcg Levothyroxine qd and BW 1/25 normal so no med change   Chol on Lipitor 20 qd  - BW to goal 1/25 so no med change    CAD / MI 8/15, stress tst neg 9/17 - last seen by cardio ~6/23 and did echo, on ASA and statin, no symptoms   Hx of low  Vit D-  on 2000 IU and BW ok 3/17  Osteoporosis on DEXA  on Fosamax since ~12/19 so due to stop med but did not so stop now and rechk DEXA now -  if similar will need eval w rheum for further txn   Inflammed appearing seborrhea on L upper chest wall so set up derm eval w Dr Lawrence    Immunoglobin G elevated and so was referred  to heme/onc and eval neg per 7/17 note and says had FU 2018 and no FU needed/prn  MT fxs summer 2023 when foot got stuck and fell so was in a boot for a while and got better so back to usual activities    HM-  -flu shot 12/24  -Tdap 1/12 so due but may be best covered by her insurance at pharmacy so set up  -[id number] 11/22   -[id number] 1/21   -Shingrix 1/2020, 3/2020  -Zostavax 2014  -RSV discussed to get at pharmacy  -COVID vaccines 3/21, 4/21 and booster declines  -PAP/gyn 5/15 w neg PAP/HPV and done 4/19 that was normal and told no further FU/PAPs needed   -Mammo 1/25 so nxt ordered   -Scope not done and considering so direct request sent and, FIT neg 11/24 so nxt ordered  -DEXA 11/22  w Osteoporosis and txn as above and nxt ordered and is set up for 2/25  -BW 1/25 reviewed and nxt ordered   -Hep C screen neg w heme in past  -EKG 9/13  -MWV 1/2020 w last dr   -Physical covered w insurance so done today 1/25  From Physical:  Eye dr exam w glasses UTD   Dental exam UTD  Hearing is good  -Falls screen neg 1/25 today  -PHQ screen neg 1/25 today           Past Medical/Surgical/Family/Social History     The following have been reviewed and updated as appropriate in this visit:   Allergies  Meds  Problems         Past Medical History:   Diagnosis Date    Allergic rhinitis     Arthritis     Coronary artery disease     Hyperlipidemia     Hyperproteinemia     w neg heme eval    Hypertension     Hypothyroidism     Lipid disorder     Vertigo        Past Surgical History   Procedure Laterality Date    Adenoidectomy      Cardiac catheterization      w 90% occlusion 8/15    Cholecystectomy      Knee surgery Right     ORIF patella    Tonsillectomy         Family History   Problem Relation Name Age of Onset    Hypertension Biological Mother      Osteoporosis Biological Mother      Heart attack Biological Father          1st age 36, mild one after that and COD 39    Heart disease Biological Father      Hypertension Biological Father      Heart disease Biological Brother Philip 44        CABG x4    No Known Problems Biological Brother Srinivasan     Drug abuse Biological Brother Dedrick        Social History     Tobacco Use    Smoking status: Former    Smokeless tobacco: Never    Tobacco comments:     stopped @ age 29   Vaping Use    Vaping status: Never Used   Substance Use Topics    Alcohol use: No    Drug use: Never      Allergies and Medications     Allergies   Allergen Reactions    No Known Allergies          Outpatient Encounter Medications as of 1/17/2025:     levothyroxine (SYNTHROID) 75 mcg tablet, Take 1 tablet (75 mcg total) by mouth daily. , before breakfast, on an empty stomach.    [DISCONTINUED]  levothyroxine (SYNTHROID) 75 mcg tablet, TAKE 1 TABLET (75 MCG TOTAL) BY MOUTH DAILY. , BEFORE BREAKFAST, ON AN EMPTY STOMACH.    [DISCONTINUED] azithromycin (ZITHROMAX) 250 mg tablet, Take 1 tablet (250 mg total) by mouth daily for 5 days. Take first 2 tablets together, then 1 every day until finished.    [DISCONTINUED] alendronate (FOSAMAX) 70 mg tablet, TAKE 1 TABLET BY MOUTH ONCE A WEEK ON AN EMPTY STOMACH. DO NOT LIE DOWN FOR THE NEXT 30 MIN    atorvastatin (LIPITOR) 20 mg tablet, TAKE 1 TABLET (20 MG TOTAL) BY MOUTH AT NIGHT    [DISCONTINUED] levothyroxine (SYNTHROID) 75 mcg tablet, TAKE 1 TABLET (75 MCG TOTAL) BY MOUTH DAILY. , BEFORE BREAKFAST, ON AN EMPTY STOMACH.    multivitamin tablet, Take by mouth daily. w Vit C    aspirin 81 mg enteric coated tablet, Take 81 mg by mouth daily.    cholecalciferol, vitamin D3, 1,000 unit capsule, Take 2 capsules by mouth daily.   Review of Systems       Review of Systems   Constitutional:  Negative for appetite change, fatigue, fever and unexpected weight change.   HENT:  Negative for congestion, ear pain, hearing loss, rhinorrhea and sore throat.    Eyes:  Negative for discharge and visual disturbance.   Respiratory:  Negative for cough, shortness of breath and wheezing.    Cardiovascular:  Negative for chest pain and leg swelling.   Gastrointestinal:  Negative for abdominal pain, blood in stool, constipation, diarrhea, nausea and vomiting.   Endocrine: Negative for polyuria.   Genitourinary:  Negative for decreased urine volume, dysuria, frequency, hematuria, urgency, vaginal bleeding and vaginal discharge.   Musculoskeletal:  Negative for arthralgias.   Skin:  Negative for rash.   Allergic/Immunologic: Negative for environmental allergies.   Neurological:  Negative for seizures, weakness and headaches.   Hematological:  Negative for adenopathy.   Psychiatric/Behavioral:  Negative for behavioral problems and dysphoric mood.       Physical Examination       Objective  "    Vitals:    01/17/25 1309   BP: 122/64   Resp: 16       Wt Readings from Last 3 Encounters:   01/17/25 63.6 kg (140 lb 3.2 oz)   01/02/25 63.5 kg (140 lb)   01/12/24 62.6 kg (138 lb)       Body mass index is 23.33 kg/m².    Ht Readings from Last 3 Encounters:   01/17/25 1.651 m (5' 5\")   01/02/25 1.651 m (5' 5\")   01/12/24 1.651 m (5' 5\")       BP Readings from Last 3 Encounters:   01/17/25 122/64   01/02/25 138/88   01/12/24 128/72       Physical Exam  Vitals and nursing note reviewed.   Constitutional:       General: She is not in acute distress.     Appearance: She is well-developed. She is not ill-appearing.   HENT:      Head: Normocephalic and atraumatic.      Right Ear: Ear canal normal. There is impacted cerumen.      Left Ear: Ear canal normal. There is impacted cerumen.      Ears:      Comments: Advised to use Debrox ear wax gtts     Nose: Nose normal.      Mouth/Throat:      Pharynx: Oropharynx is clear.   Eyes:      General: Lids are normal.      Conjunctiva/sclera: Conjunctivae normal.   Neck:      Thyroid: No thyromegaly.      Vascular: No carotid bruit.      Trachea: Trachea normal.   Cardiovascular:      Rate and Rhythm: Normal rate and regular rhythm.      Pulses:           Dorsalis pedis pulses are 2+ on the right side and 2+ on the left side.      Heart sounds: Normal heart sounds.   Pulmonary:      Effort: Pulmonary effort is normal. No respiratory distress.      Breath sounds: Normal breath sounds. No wheezing.   Abdominal:      General: Bowel sounds are normal.      Palpations: Abdomen is soft.      Tenderness: There is no abdominal tenderness.   Musculoskeletal:         General: No signs of injury.      Cervical back: Neck supple.      Right lower leg: No edema.      Left lower leg: No edema.   Lymphadenopathy:      Cervical: No cervical adenopathy.   Skin:     General: Skin is warm and dry.      Findings: No rash.      Comments: Many scattered angiomas   L upper chest wall w seborrhea but " base is pink and a little irritated so referred to derm   Neurological:      General: No focal deficit present.      Mental Status: She is alert and oriented to person, place, and time.      Motor: No abnormal muscle tone.      Coordination: Coordination normal.      Comments: Normal strength and ROM in extrems   Psychiatric:         Mood and Affect: Mood normal.         Behavior: Behavior normal. Behavior is cooperative.        Laboratory Results     Lab Results   Component Value Date    WBC 5.45 06/23/2022    HGB 12.9 06/23/2022    HCT 39.2 06/23/2022    MCV 94.0 06/23/2022     06/23/2022          Chemistry        Component Value Date/Time     01/08/2025 1026     (L) 06/23/2022 1106    K 4.6 01/08/2025 1026    K 4.4 06/23/2022 1106     01/08/2025 1026     06/23/2022 1106    CO2 22 01/08/2025 1026    CO2 28 06/23/2022 1106    BUN 9 01/08/2025 1026    BUN 9 06/23/2022 1106    CREATININE 0.67 01/08/2025 1026    CREATININE 0.6 06/23/2022 1106        Component Value Date/Time    CALCIUM 9.5 06/23/2022 1106    ALKPHOS 72 01/08/2025 1026    ALKPHOS 65 06/23/2022 1106    AST 23 01/08/2025 1026    AST 34 06/23/2022 1106    ALT 13 01/08/2025 1026    ALT 19 06/23/2022 1106    BILITOT 0.3 01/08/2025 1026    BILITOT 0.7 06/23/2022 1106            Lab Results   Component Value Date    GLUCOSE 83 01/08/2025    CALCIUM 9.5 06/23/2022     01/08/2025    K 4.6 01/08/2025    CO2 22 01/08/2025     01/08/2025    BUN 9 01/08/2025    CREATININE 0.67 01/08/2025       Lab Results   Component Value Date    CHOL 142 01/08/2025    CHOL 142 12/22/2023    CHOL 156 09/01/2023     Lab Results   Component Value Date    HDL 59 01/08/2025    HDL 68 12/22/2023    HDL 71 09/01/2023     Lab Results   Component Value Date    LDLCALC 69 01/08/2025    LDLCALC 61 12/22/2023    LDLCALC 74 09/01/2023     Lab Results   Component Value Date    TRIG 69 01/08/2025    TRIG 64 12/22/2023    TRIG 53 09/01/2023     No  "results found for: \"CHOLHDL\"    Lab Results   Component Value Date    TSH 2.280 01/08/2025       No results found for: \"HGBA1C\"    Lab Results   Component Value Date    HEPCAB NEGATIVE 05/08/2017         Immunization History   Administered Date(s) Administered    INFLUENZA VACCINE QUAD ADJUVANTED 65 and OLDER 11/23/2022, 11/01/2023    Influenza Split Preservative Free ID 09/10/2013    Influenza Trivalent High Dose Preservative Free 65 yrs and up IM 09/30/2014, 12/13/2024    Influenza Vaccine High Dose 65 And Older 10/30/2020    Influenza Vaccine Quadrivalent 3 Yr And Older 08/01/2016    Influenza Vaccine Quadrivalent Preservative Free 6-35 Months 09/10/2015    Influenza, Live, Intranasal, Quadrivalent 11/09/2017    Influenza, Unspecified 09/10/2015, 01/18/2022, 11/24/2023    Pneumococcal Conjugate 13-Valent 11/23/2022    Pneumococcal Polysaccharide 01/08/2021    SARS-COV-2 (COVID-19) VACCINE, MODERNA MONOVALENT 03/26/2021, 04/30/2021    Tdap 01/16/2012    Zoster 12/22/2014    Zoster Vaccine Recombinant Adjuvanted (Shingrix) 06/08/2018, 01/01/2020, 03/01/2020         Health Maintenance Topics with due status: Overdue       Topic Date Due    DEXA Scan 11/25/2024     Health Maintenance Topics with due status: Postponed       Topic Postponed Until    DTaP, Tdap, and Td Vaccines 01/17/2025 (Originally 1/16/2022)    COVID-19 Vaccine 01/17/2026 (Originally 9/1/2024)     Health Maintenance Topics with due status: Not Due       Topic Last Completion Date    Colorectal Cancer Screening 11/14/2024    Breast Cancer Screening 01/04/2025    Depression Screening 01/17/2025    Falls Risk Screening 01/17/2025    RSV Vaccine Not Due     Health Maintenance Topics with due status: Completed       Topic Last Completion Date    Hepatitis C Screening 11/14/2017    Zoster Vaccine 03/01/2020    Pneumococcal (65 years and older) 11/23/2022    Influenza Vaccine 12/13/2024     Health Maintenance Topics with due status: Aged Out       Topic " Date Due    Meningococcal ACWY Aged Out    RSV <20 months Aged Out    HIB Vaccines Aged Out    Hepatitis B Vaccines Aged Out    IPV Vaccines Aged Out    Meningococcal B Aged Out    HPV Vaccines Aged Out     Health Maintenance Topics with due status: Discontinued       Topic Date Due    Medicare Annual Wellness Visit Discontinued          Assessment and Plan       Assessment/Plan     Problem List Items Addressed This Visit          Circulatory    CAD (coronary artery disease)    Overview     On ASA and statin         Current Assessment & Plan     No symptoms            Musculoskeletal    Age-related osteoporosis without current pathological fracture    Overview     On Alendronate from 3761-7151 then stopped         Current Assessment & Plan     Do DEXA scan         Relevant Orders    DEXA BONE DENSITY       Endocrine/Metabolic    Hypothyroidism    Overview     On Levothyroxine         Current Assessment & Plan     See History of Present Illness section for assessment and plan           Relevant Medications    levothyroxine (SYNTHROID) 75 mcg tablet    Other Relevant Orders    TSH w reflex FT4       Dermatologic    Encounter for surveillance of abnormal nevi    Relevant Orders    Ambulatory referral to Dermatology       Other    Lipid disorder    Overview     On Atorvastatin         Current Assessment & Plan     See History of Present Illness section for assessment and plan           Relevant Orders    Comprehensive metabolic panel    Lipid panel    Routine physical examination - Primary    Overview     -Eat diet with regular meals including 2-4 fruit servings , 2-4 vegetable servings, whole grains and lean protien each day  -control portions of carbs and keep down fats and sugars in diet  -exercise for 150 mins per week of cardio or more and 1-2 days per week of something strengthening like yoga, pilates or lifting  -wear sunblock w SPF of 30 or higher  in sun to protect your skin, and reapply often  -avoid all  tobacco products  -limit alcohol and caffiene  -aim to get 6-8 hrs of sleep each night  -see your eye doctor every 2-3 yrs  -see your dentist every 6-12 mos  FU for physical in one year              Current Assessment & Plan     Exam done  Next DEXA, mammo, labwork, FIT ordered  Set up w derm to chk skin         Relevant Orders    Fecal Immunochemical    Encounter for screening mammogram for malignant neoplasm of breast    Relevant Orders    BI SCREENING MAMMOGRAM BILATERAL(TOMOSYNTHESIS)       Return in about 1 year (around 1/17/2026) for physical, medication follow up.    Orders Placed This Encounter   Procedures    DEXA BONE DENSITY     Standing Status:   Future     Standing Expiration Date:   1/17/2026     Order Specific Question:   Does the patient take a Calcium supplement?     Answer:   Yes     Order Specific Question:   Release to patient     Answer:   Immediate [1]    BI SCREENING MAMMOGRAM BILATERAL(TOMOSYNTHESIS)     Standing Status:   Future     Standing Expiration Date:   3/17/2026     Order Specific Question:   Release to patient     Answer:   Immediate [1]    Fecal Immunochemical     Standing Status:   Future     Number of Occurrences:   1     Standing Expiration Date:   1/17/2026     Order Specific Question:   Release to patient     Answer:   Immediate [1]    TSH w reflex FT4     Standing Status:   Future     Number of Occurrences:   1     Standing Expiration Date:   1/17/2026     Order Specific Question:   Release to patient     Answer:   Immediate [1]    Comprehensive metabolic panel     Standing Status:   Future     Number of Occurrences:   1     Standing Expiration Date:   1/17/2026     Order Specific Question:   Release to patient     Answer:   Immediate [1]    Lipid panel     Standing Status:   Future     Number of Occurrences:   1     Standing Expiration Date:   1/17/2026     Order Specific Question:   Release to patient     Answer:   Immediate [1]    Ambulatory referral to Dermatology      Standing Status:   Future     Standing Expiration Date:   7/17/2025     Referral Priority:   Routine     Referral Type:   Consultation     Referral Reason:   Specialty Services Required     Referred to Provider:   Linda Lawrence MD     Number of Visits Requested:   10            Inés Peña DO  1/17/2025

## 2025-01-17 ENCOUNTER — OFFICE VISIT (OUTPATIENT)
Dept: PRIMARY CARE | Facility: CLINIC | Age: 70
End: 2025-01-17
Payer: COMMERCIAL

## 2025-01-17 VITALS
DIASTOLIC BLOOD PRESSURE: 64 MMHG | SYSTOLIC BLOOD PRESSURE: 122 MMHG | WEIGHT: 140.2 LBS | RESPIRATION RATE: 16 BRPM | HEIGHT: 65 IN | BODY MASS INDEX: 23.36 KG/M2

## 2025-01-17 DIAGNOSIS — Z00.00 ROUTINE PHYSICAL EXAMINATION: Primary | ICD-10-CM

## 2025-01-17 DIAGNOSIS — I25.10 CORONARY ARTERY DISEASE INVOLVING NATIVE CORONARY ARTERY OF NATIVE HEART WITHOUT ANGINA PECTORIS: ICD-10-CM

## 2025-01-17 DIAGNOSIS — Z13.89 ENCOUNTER FOR SURVEILLANCE OF ABNORMAL NEVI: ICD-10-CM

## 2025-01-17 DIAGNOSIS — M81.0 AGE-RELATED OSTEOPOROSIS WITHOUT CURRENT PATHOLOGICAL FRACTURE: ICD-10-CM

## 2025-01-17 DIAGNOSIS — E78.9 LIPID DISORDER: ICD-10-CM

## 2025-01-17 DIAGNOSIS — E03.9 ACQUIRED HYPOTHYROIDISM: ICD-10-CM

## 2025-01-17 DIAGNOSIS — Z12.31 ENCOUNTER FOR SCREENING MAMMOGRAM FOR MALIGNANT NEOPLASM OF BREAST: ICD-10-CM

## 2025-01-17 PROCEDURE — 99213 OFFICE O/P EST LOW 20 MIN: CPT | Mod: 25 | Performed by: FAMILY MEDICINE

## 2025-01-17 PROCEDURE — 3008F BODY MASS INDEX DOCD: CPT | Performed by: FAMILY MEDICINE

## 2025-01-17 PROCEDURE — 99397 PER PM REEVAL EST PAT 65+ YR: CPT | Performed by: FAMILY MEDICINE

## 2025-01-17 RX ORDER — LEVOTHYROXINE SODIUM 75 UG/1
75 TABLET ORAL
Qty: 90 TABLET | Refills: 1 | Status: SHIPPED | OUTPATIENT
Start: 2025-01-17

## 2025-01-17 ASSESSMENT — ENCOUNTER SYMPTOMS
APPETITE CHANGE: 0
DYSURIA: 0
CONSTIPATION: 0
FEVER: 0
DIARRHEA: 0
FREQUENCY: 0
HEMATURIA: 0
HEADACHES: 0
UNEXPECTED WEIGHT CHANGE: 0
SEIZURES: 0
DYSPHORIC MOOD: 0
NAUSEA: 0
ARTHRALGIAS: 0
RHINORRHEA: 0
BLOOD IN STOOL: 0
ABDOMINAL PAIN: 0
WHEEZING: 0
COUGH: 0
FATIGUE: 0
VOMITING: 0
SORE THROAT: 0
WEAKNESS: 0
SHORTNESS OF BREATH: 0
EYE DISCHARGE: 0
ADENOPATHY: 0

## 2025-01-17 ASSESSMENT — PATIENT HEALTH QUESTIONNAIRE - PHQ9: SUM OF ALL RESPONSES TO PHQ9 QUESTIONS 1 & 2: 0

## 2025-04-02 ENCOUNTER — HOSPITAL ENCOUNTER (OUTPATIENT)
Dept: RADIOLOGY | Age: 70
Discharge: HOME | End: 2025-04-02
Attending: FAMILY MEDICINE
Payer: COMMERCIAL

## 2025-04-02 DIAGNOSIS — M81.0 AGE-RELATED OSTEOPOROSIS WITHOUT CURRENT PATHOLOGICAL FRACTURE: ICD-10-CM

## 2025-04-02 PROCEDURE — 77080 DXA BONE DENSITY AXIAL: CPT

## 2025-04-03 ENCOUNTER — RESULTS FOLLOW-UP (OUTPATIENT)
Dept: PRIMARY CARE | Facility: CLINIC | Age: 70
End: 2025-04-03

## 2025-08-01 RX ORDER — LEVOTHYROXINE SODIUM 75 UG/1
75 TABLET ORAL
Qty: 90 TABLET | Refills: 1 | Status: SHIPPED | OUTPATIENT
Start: 2025-08-01

## 2025-08-01 NOTE — TELEPHONE ENCOUNTER
"  Medicine Refill Request    Last Office Visit: 1/17/2025   Last Consult Visit: Visit date not found  Last Telemedicine Visit: Visit date not found    Next Appointment: 1/23/2026      Current Outpatient Medications:     aspirin 81 mg enteric coated tablet, Take 81 mg by mouth daily., Disp: , Rfl:     atorvastatin (LIPITOR) 20 mg tablet, TAKE 1 TABLET (20 MG TOTAL) BY MOUTH AT NIGHT, Disp: 90 tablet, Rfl: 2    cholecalciferol, vitamin D3, 1,000 unit capsule, Take 2 capsules by mouth daily., Disp: , Rfl:     levothyroxine (SYNTHROID) 75 mcg tablet, Take 1 tablet (75 mcg total) by mouth daily. , before breakfast, on an empty stomach., Disp: 90 tablet, Rfl: 1    multivitamin tablet, Take by mouth daily. w Vit C, Disp: , Rfl:     BP Readings from Last 3 Encounters:   01/17/25 122/64   01/02/25 138/88   01/12/24 128/72       Recent Lab results:  Lab Results   Component Value Date    CHOL 142 01/08/2025   ,   Lab Results   Component Value Date    HDL 59 01/08/2025   ,   Lab Results   Component Value Date    LDLCALC 69 01/08/2025   ,   Lab Results   Component Value Date    TRIG 69 01/08/2025        Lab Results   Component Value Date    GLUCOSE 83 01/08/2025   , No results found for: \"HGBA1C\"      Lab Results   Component Value Date    CREATININE 0.67 01/08/2025       Lab Results   Component Value Date    TSH 2.280 01/08/2025         No results found for: \"HGBA1C\"  "